# Patient Record
Sex: FEMALE | Race: WHITE | Employment: PART TIME | RURAL
[De-identification: names, ages, dates, MRNs, and addresses within clinical notes are randomized per-mention and may not be internally consistent; named-entity substitution may affect disease eponyms.]

---

## 2017-01-02 PROBLEM — F41.9 ANXIETY: Status: ACTIVE | Noted: 2017-01-02

## 2017-01-02 PROBLEM — M79.602 LEFT ARM PAIN: Status: ACTIVE | Noted: 2017-01-02

## 2017-01-02 PROBLEM — I10 ESSENTIAL HYPERTENSION: Status: ACTIVE | Noted: 2017-01-02

## 2017-01-02 PROBLEM — N63.0 BREAST NODULE: Status: ACTIVE | Noted: 2017-01-02

## 2017-01-02 PROBLEM — M15.9 OSTEOARTHRITIS OF MULTIPLE JOINTS: Status: ACTIVE | Noted: 2017-01-02

## 2017-02-02 ENCOUNTER — NURSE ONLY (OUTPATIENT)
Dept: PRIMARY CARE CLINIC | Age: 52
End: 2017-02-02
Payer: COMMERCIAL

## 2017-02-02 DIAGNOSIS — Z23 NEED FOR PERTUSSIS VACCINATION: Primary | ICD-10-CM

## 2017-02-02 PROCEDURE — 90471 IMMUNIZATION ADMIN: CPT | Performed by: FAMILY MEDICINE

## 2017-02-02 PROCEDURE — 90715 TDAP VACCINE 7 YRS/> IM: CPT | Performed by: FAMILY MEDICINE

## 2017-02-08 RX ORDER — OSELTAMIVIR PHOSPHATE 75 MG/1
75 CAPSULE ORAL DAILY
Qty: 10 CAPSULE | Refills: 0 | Status: SHIPPED | OUTPATIENT
Start: 2017-02-08 | End: 2017-02-18

## 2017-05-15 RX ORDER — HYDROCHLOROTHIAZIDE 25 MG/1
TABLET ORAL
Qty: 30 TABLET | Refills: 1 | Status: SHIPPED | OUTPATIENT
Start: 2017-05-15 | End: 2017-06-02 | Stop reason: SDUPTHER

## 2017-06-02 ENCOUNTER — OFFICE VISIT (OUTPATIENT)
Dept: PRIMARY CARE CLINIC | Age: 52
End: 2017-06-02
Payer: COMMERCIAL

## 2017-06-02 VITALS
RESPIRATION RATE: 20 BRPM | BODY MASS INDEX: 31.15 KG/M2 | DIASTOLIC BLOOD PRESSURE: 72 MMHG | OXYGEN SATURATION: 98 % | SYSTOLIC BLOOD PRESSURE: 122 MMHG | HEART RATE: 55 BPM | WEIGHT: 165 LBS | HEIGHT: 61 IN

## 2017-06-02 DIAGNOSIS — I10 ESSENTIAL HYPERTENSION: Primary | ICD-10-CM

## 2017-06-02 DIAGNOSIS — M15.9 OSTEOARTHRITIS OF MULTIPLE JOINTS, UNSPECIFIED OSTEOARTHRITIS TYPE: ICD-10-CM

## 2017-06-02 DIAGNOSIS — Z78.0 POSTMENOPAUSAL: ICD-10-CM

## 2017-06-02 PROBLEM — F41.9 ANXIETY: Status: RESOLVED | Noted: 2017-01-02 | Resolved: 2017-06-02

## 2017-06-02 PROBLEM — M79.602 LEFT ARM PAIN: Status: RESOLVED | Noted: 2017-01-02 | Resolved: 2017-06-02

## 2017-06-02 PROCEDURE — 99214 OFFICE O/P EST MOD 30 MIN: CPT | Performed by: FAMILY MEDICINE

## 2017-06-02 RX ORDER — HYDROCHLOROTHIAZIDE 25 MG/1
TABLET ORAL
Qty: 30 TABLET | Refills: 5 | Status: SHIPPED | OUTPATIENT
Start: 2017-06-02 | End: 2018-12-17 | Stop reason: ALTCHOICE

## 2017-06-02 ASSESSMENT — ENCOUNTER SYMPTOMS
NAUSEA: 0
EYE REDNESS: 0
RHINORRHEA: 0
EYE ITCHING: 0
VOMITING: 0
COUGH: 0
SORE THROAT: 0
CONSTIPATION: 0
EYE DISCHARGE: 0
ABDOMINAL PAIN: 0
DIARRHEA: 0
SHORTNESS OF BREATH: 0

## 2017-06-02 ASSESSMENT — PATIENT HEALTH QUESTIONNAIRE - PHQ9
2. FEELING DOWN, DEPRESSED OR HOPELESS: 0
SUM OF ALL RESPONSES TO PHQ9 QUESTIONS 1 & 2: 0
1. LITTLE INTEREST OR PLEASURE IN DOING THINGS: 0
SUM OF ALL RESPONSES TO PHQ QUESTIONS 1-9: 0

## 2017-06-11 PROBLEM — Z78.0 POSTMENOPAUSAL: Status: ACTIVE | Noted: 2017-06-11

## 2017-06-12 ENCOUNTER — TELEPHONE (OUTPATIENT)
Dept: PRIMARY CARE CLINIC | Age: 52
End: 2017-06-12

## 2017-06-14 RX ORDER — ESTRADIOL 0.1 MG/G
1 CREAM VAGINAL EVERY OTHER DAY
Qty: 1 TUBE | Refills: 5 | Status: SHIPPED | OUTPATIENT
Start: 2017-06-14 | End: 2018-03-26 | Stop reason: CLARIF

## 2017-12-13 ENCOUNTER — NURSE ONLY (OUTPATIENT)
Dept: PRIMARY CARE CLINIC | Age: 52
End: 2017-12-13
Payer: COMMERCIAL

## 2017-12-13 ENCOUNTER — HOSPITAL ENCOUNTER (OUTPATIENT)
Dept: GENERAL RADIOLOGY | Age: 52
Discharge: HOME | End: 2017-12-13
Attending: FAMILY MEDICINE | Admitting: FAMILY MEDICINE

## 2017-12-13 DIAGNOSIS — Z23 NEED FOR INFLUENZA VACCINATION: Primary | ICD-10-CM

## 2017-12-13 DIAGNOSIS — Z12.39 BREAST CANCER SCREENING: ICD-10-CM

## 2017-12-13 PROCEDURE — 90688 IIV4 VACCINE SPLT 0.5 ML IM: CPT | Performed by: FAMILY MEDICINE

## 2017-12-13 PROCEDURE — 90471 IMMUNIZATION ADMIN: CPT | Performed by: FAMILY MEDICINE

## 2017-12-15 ENCOUNTER — TELEPHONE (OUTPATIENT)
Dept: PRIMARY CARE CLINIC | Age: 52
End: 2017-12-15

## 2018-01-19 ENCOUNTER — TELEPHONE (OUTPATIENT)
Dept: PRIMARY CARE CLINIC | Age: 53
End: 2018-01-19

## 2018-03-09 ENCOUNTER — TELEPHONE (OUTPATIENT)
Dept: PRIMARY CARE CLINIC | Age: 53
End: 2018-03-09

## 2018-03-09 RX ORDER — AMOXICILLIN AND CLAVULANATE POTASSIUM 875; 125 MG/1; MG/1
1 TABLET, FILM COATED ORAL 2 TIMES DAILY
Qty: 20 TABLET | Refills: 0 | Status: SHIPPED | OUTPATIENT
Start: 2018-03-09 | End: 2018-03-19

## 2018-03-26 ENCOUNTER — OFFICE VISIT (OUTPATIENT)
Dept: PRIMARY CARE CLINIC | Age: 53
End: 2018-03-26
Payer: COMMERCIAL

## 2018-03-26 VITALS
BODY MASS INDEX: 33.04 KG/M2 | RESPIRATION RATE: 20 BRPM | OXYGEN SATURATION: 99 % | SYSTOLIC BLOOD PRESSURE: 128 MMHG | HEART RATE: 62 BPM | WEIGHT: 175 LBS | DIASTOLIC BLOOD PRESSURE: 82 MMHG | HEIGHT: 61 IN

## 2018-03-26 DIAGNOSIS — I10 ESSENTIAL HYPERTENSION: Primary | ICD-10-CM

## 2018-03-26 DIAGNOSIS — M65.332 TRIGGER MIDDLE FINGER OF LEFT HAND: ICD-10-CM

## 2018-03-26 DIAGNOSIS — Z12.11 SCREENING FOR COLON CANCER: ICD-10-CM

## 2018-03-26 DIAGNOSIS — M15.9 OSTEOARTHRITIS OF MULTIPLE JOINTS, UNSPECIFIED OSTEOARTHRITIS TYPE: ICD-10-CM

## 2018-03-26 PROCEDURE — 99214 OFFICE O/P EST MOD 30 MIN: CPT | Performed by: FAMILY MEDICINE

## 2018-03-26 RX ORDER — MELATONIN
1000 NIGHTLY
COMMUNITY

## 2018-03-26 RX ORDER — DICLOFENAC SODIUM 75 MG/1
75 TABLET, DELAYED RELEASE ORAL 2 TIMES DAILY
Qty: 60 TABLET | Refills: 3 | Status: SHIPPED | OUTPATIENT
Start: 2018-03-26 | End: 2018-03-28 | Stop reason: SINTOL

## 2018-03-26 ASSESSMENT — ENCOUNTER SYMPTOMS
EYE ITCHING: 1
EYE DISCHARGE: 0
VOMITING: 0
RHINORRHEA: 0
COUGH: 0
SHORTNESS OF BREATH: 0
EYE REDNESS: 0
SORE THROAT: 0
ABDOMINAL PAIN: 0
NAUSEA: 0
CONSTIPATION: 0
DIARRHEA: 0

## 2018-03-27 ENCOUNTER — NURSE ONLY (OUTPATIENT)
Dept: PRIMARY CARE CLINIC | Age: 53
End: 2018-03-27
Payer: COMMERCIAL

## 2018-03-27 DIAGNOSIS — Z12.11 COLON CANCER SCREENING: Primary | ICD-10-CM

## 2018-03-27 LAB
CONTROL: NORMAL
HEMOCCULT STL QL: NORMAL

## 2018-03-27 PROCEDURE — 82274 ASSAY TEST FOR BLOOD FECAL: CPT | Performed by: FAMILY MEDICINE

## 2018-03-28 ENCOUNTER — TELEPHONE (OUTPATIENT)
Dept: PRIMARY CARE CLINIC | Age: 53
End: 2018-03-28

## 2018-03-28 RX ORDER — CELECOXIB 100 MG/1
100 CAPSULE ORAL DAILY
Qty: 60 CAPSULE | Refills: 3 | Status: SHIPPED | OUTPATIENT
Start: 2018-03-28 | End: 2019-07-22

## 2018-04-19 PROBLEM — M65.332 TRIGGER MIDDLE FINGER OF LEFT HAND: Status: ACTIVE | Noted: 2018-04-19

## 2018-07-03 ENCOUNTER — TELEPHONE (OUTPATIENT)
Dept: ORTHOPEDIC SURGERY | Age: 53
End: 2018-07-03

## 2018-07-31 DIAGNOSIS — R68.89 FORGETFULNESS: Primary | ICD-10-CM

## 2018-07-31 RX ORDER — DICLOFENAC SODIUM 75 MG/1
75 TABLET, DELAYED RELEASE ORAL 2 TIMES DAILY
Qty: 60 TABLET | Refills: 5 | Status: SHIPPED | OUTPATIENT
Start: 2018-07-31 | End: 2018-12-17 | Stop reason: SDUPTHER

## 2018-07-31 NOTE — TELEPHONE ENCOUNTER
Patient needs referral to Neurology per [facility]- for brain fog, popping in her head/white noise sounds. Forgetfulness-family history of dementia. Former [facility] pt. Is going to start seeing us.

## 2018-12-17 ENCOUNTER — OFFICE VISIT (OUTPATIENT)
Dept: PRIMARY CARE CLINIC | Age: 53
End: 2018-12-17
Payer: MEDICAID

## 2018-12-17 VITALS
SYSTOLIC BLOOD PRESSURE: 134 MMHG | BODY MASS INDEX: 31.97 KG/M2 | WEIGHT: 169.2 LBS | HEART RATE: 74 BPM | DIASTOLIC BLOOD PRESSURE: 80 MMHG | OXYGEN SATURATION: 97 %

## 2018-12-17 DIAGNOSIS — I10 ESSENTIAL HYPERTENSION: Primary | ICD-10-CM

## 2018-12-17 DIAGNOSIS — Z12.39 SCREENING FOR BREAST CANCER: ICD-10-CM

## 2018-12-17 DIAGNOSIS — L91.8 SKIN TAG: ICD-10-CM

## 2018-12-17 DIAGNOSIS — Z78.0 POSTMENOPAUSAL: ICD-10-CM

## 2018-12-17 DIAGNOSIS — J20.9 ACUTE BRONCHITIS, UNSPECIFIED ORGANISM: ICD-10-CM

## 2018-12-17 PROCEDURE — 11200 RMVL SKIN TAGS UP TO&INC 15: CPT | Performed by: NURSE PRACTITIONER

## 2018-12-17 PROCEDURE — 99213 OFFICE O/P EST LOW 20 MIN: CPT | Performed by: NURSE PRACTITIONER

## 2018-12-17 PROCEDURE — 90471 IMMUNIZATION ADMIN: CPT | Performed by: NURSE PRACTITIONER

## 2018-12-17 PROCEDURE — G8482 FLU IMMUNIZE ORDER/ADMIN: HCPCS | Performed by: NURSE PRACTITIONER

## 2018-12-17 PROCEDURE — G8427 DOCREV CUR MEDS BY ELIG CLIN: HCPCS | Performed by: NURSE PRACTITIONER

## 2018-12-17 PROCEDURE — 3017F COLORECTAL CA SCREEN DOC REV: CPT | Performed by: NURSE PRACTITIONER

## 2018-12-17 PROCEDURE — 90688 IIV4 VACCINE SPLT 0.5 ML IM: CPT | Performed by: NURSE PRACTITIONER

## 2018-12-17 PROCEDURE — G8417 CALC BMI ABV UP PARAM F/U: HCPCS | Performed by: NURSE PRACTITIONER

## 2018-12-17 PROCEDURE — 4004F PT TOBACCO SCREEN RCVD TLK: CPT | Performed by: NURSE PRACTITIONER

## 2018-12-17 RX ORDER — AZITHROMYCIN 250 MG/1
TABLET, FILM COATED ORAL
Qty: 6 TABLET | Refills: 0 | Status: SHIPPED | OUTPATIENT
Start: 2018-12-17 | End: 2018-12-27

## 2018-12-17 RX ORDER — DICLOFENAC SODIUM 75 MG/1
75 TABLET, DELAYED RELEASE ORAL 2 TIMES DAILY
Qty: 60 TABLET | Refills: 5 | Status: SHIPPED | OUTPATIENT
Start: 2018-12-17 | End: 2020-02-24 | Stop reason: SDUPTHER

## 2018-12-17 ASSESSMENT — ENCOUNTER SYMPTOMS
ABDOMINAL PAIN: 0
VOMITING: 0
COUGH: 1
SORE THROAT: 0
NAUSEA: 0
SHORTNESS OF BREATH: 0
WHEEZING: 1
EYE PAIN: 0

## 2018-12-17 ASSESSMENT — PATIENT HEALTH QUESTIONNAIRE - PHQ9
2. FEELING DOWN, DEPRESSED OR HOPELESS: 0
1. LITTLE INTEREST OR PLEASURE IN DOING THINGS: 0
SUM OF ALL RESPONSES TO PHQ QUESTIONS 1-9: 0
SUM OF ALL RESPONSES TO PHQ9 QUESTIONS 1 & 2: 0
SUM OF ALL RESPONSES TO PHQ QUESTIONS 1-9: 0

## 2018-12-17 NOTE — PROGRESS NOTES
Have you seen any other physician or provider since your last visit? no    Have you had any other diagnostic tests since your last visit? no    Have you changed or stopped any medications since your last visit including any over-the-counter medicines, vitamins, or herbal medicines? no     Are you taking all your prescribed medications? Yes  If NO, why? -  N/A      REVIEW OF SYSTEMS:  Review of Systems   Constitutional: Negative for chills and fever. HENT: Negative for ear pain and sore throat. Eyes: Negative for pain and visual disturbance. Respiratory: Positive for cough and wheezing. Negative for shortness of breath. Cardiovascular: Negative for chest pain, palpitations and leg swelling. Gastrointestinal: Negative for abdominal pain, nausea and vomiting. Genitourinary: Negative for dysuria and hematuria. Musculoskeletal: Negative for joint swelling. Skin: Negative for rash. Neurological: Negative for dizziness and weakness. Psychiatric/Behavioral: Negative for sleep disturbance.

## 2019-01-21 ENCOUNTER — HOSPITAL ENCOUNTER (OUTPATIENT)
Dept: MAMMOGRAPHY | Facility: HOSPITAL | Age: 54
Discharge: HOME | End: 2019-01-21
Payer: MEDICAID

## 2019-01-21 ENCOUNTER — HOSPITAL ENCOUNTER (OUTPATIENT)
Facility: HOSPITAL | Age: 54
Discharge: HOME | End: 2019-01-21
Payer: MEDICAID

## 2019-01-21 DIAGNOSIS — I10 ESSENTIAL HYPERTENSION: ICD-10-CM

## 2019-01-21 DIAGNOSIS — Z12.39 SCREENING FOR BREAST CANCER: ICD-10-CM

## 2019-01-21 LAB
A/G RATIO: 1.7 (ref 0.8–2)
ALBUMIN SERPL-MCNC: 4.2 G/DL (ref 3.4–4.8)
ALP BLD-CCNC: 91 U/L (ref 25–100)
ALT SERPL-CCNC: 9 U/L (ref 4–36)
ANION GAP SERPL CALCULATED.3IONS-SCNC: 8 MMOL/L (ref 3–16)
AST SERPL-CCNC: 11 U/L (ref 8–33)
BASOPHILS ABSOLUTE: 0.1 K/UL (ref 0–0.1)
BASOPHILS RELATIVE PERCENT: 0.7 %
BILIRUB SERPL-MCNC: <0.2 MG/DL (ref 0.3–1.2)
BUN BLDV-MCNC: 17 MG/DL (ref 6–20)
CALCIUM SERPL-MCNC: 9.6 MG/DL (ref 8.5–10.5)
CHLORIDE BLD-SCNC: 108 MMOL/L (ref 98–107)
CHOLESTEROL, TOTAL: 203 MG/DL (ref 0–200)
CO2: 27 MMOL/L (ref 20–30)
CREAT SERPL-MCNC: 1 MG/DL (ref 0.4–1.2)
EOSINOPHILS ABSOLUTE: 0.3 K/UL (ref 0–0.4)
EOSINOPHILS RELATIVE PERCENT: 3.1 %
GFR AFRICAN AMERICAN: >59
GFR NON-AFRICAN AMERICAN: 58
GLOBULIN: 2.5 G/DL
GLUCOSE BLD-MCNC: 90 MG/DL (ref 74–106)
HCT VFR BLD CALC: 42.4 % (ref 37–47)
HDLC SERPL-MCNC: 54 MG/DL (ref 40–60)
HEMOGLOBIN: 13.3 G/DL (ref 11.5–16.5)
IMMATURE GRANULOCYTES #: 0 K/UL
IMMATURE GRANULOCYTES %: 0.2 % (ref 0–5)
LDL CHOLESTEROL CALCULATED: 118 MG/DL
LYMPHOCYTES ABSOLUTE: 2.3 K/UL (ref 1.5–4)
LYMPHOCYTES RELATIVE PERCENT: 26.5 %
MCH RBC QN AUTO: 29.3 PG (ref 27–32)
MCHC RBC AUTO-ENTMCNC: 31.4 G/DL (ref 31–35)
MCV RBC AUTO: 93.4 FL (ref 80–100)
MONOCYTES ABSOLUTE: 0.5 K/UL (ref 0.2–0.8)
MONOCYTES RELATIVE PERCENT: 5.6 %
NEUTROPHILS ABSOLUTE: 5.5 K/UL (ref 2–7.5)
NEUTROPHILS RELATIVE PERCENT: 63.9 %
PDW BLD-RTO: 12.8 % (ref 11–16)
PLATELET # BLD: 213 K/UL (ref 150–400)
PMV BLD AUTO: 11.4 FL (ref 6–10)
POTASSIUM SERPL-SCNC: 5.1 MMOL/L (ref 3.4–5.1)
RBC # BLD: 4.54 M/UL (ref 3.8–5.8)
SODIUM BLD-SCNC: 143 MMOL/L (ref 136–145)
TOTAL PROTEIN: 6.7 G/DL (ref 6.4–8.3)
TRIGL SERPL-MCNC: 155 MG/DL (ref 0–249)
VLDLC SERPL CALC-MCNC: 31 MG/DL
WBC # BLD: 8.7 K/UL (ref 4–11)

## 2019-01-21 PROCEDURE — 80061 LIPID PANEL: CPT

## 2019-01-21 PROCEDURE — 80053 COMPREHEN METABOLIC PANEL: CPT

## 2019-01-21 PROCEDURE — 77063 BREAST TOMOSYNTHESIS BI: CPT

## 2019-01-21 PROCEDURE — 85025 COMPLETE CBC W/AUTO DIFF WBC: CPT

## 2019-01-22 ENCOUNTER — TELEPHONE (OUTPATIENT)
Dept: PRIMARY CARE CLINIC | Age: 54
End: 2019-01-22

## 2019-01-28 ENCOUNTER — TELEPHONE (OUTPATIENT)
Dept: PRIMARY CARE CLINIC | Age: 54
End: 2019-01-28

## 2019-01-28 RX ORDER — DICLOFENAC SODIUM 75 MG/1
75 TABLET, DELAYED RELEASE ORAL 2 TIMES DAILY
Qty: 60 TABLET | Refills: 5 | Status: CANCELLED | OUTPATIENT
Start: 2019-01-28

## 2019-02-18 ENCOUNTER — TELEPHONE (OUTPATIENT)
Dept: PRIMARY CARE CLINIC | Age: 54
End: 2019-02-18

## 2019-02-18 DIAGNOSIS — M79.7 FIBROMYALGIA: ICD-10-CM

## 2019-02-18 DIAGNOSIS — M19.90 OSTEOARTHRITIS, UNSPECIFIED OSTEOARTHRITIS TYPE, UNSPECIFIED SITE: Primary | ICD-10-CM

## 2019-02-22 ENCOUNTER — HOSPITAL ENCOUNTER (OUTPATIENT)
Facility: HOSPITAL | Age: 54
Discharge: HOME | End: 2019-02-22
Payer: MEDICAID

## 2019-02-22 LAB
FOLATE: 12.27 NG/ML
VITAMIN B-12: >2000 PG/ML (ref 211–911)
VITAMIN D 25-HYDROXY: 30.6 (ref 32–100)

## 2019-02-22 PROCEDURE — 84207 ASSAY OF VITAMIN B-6: CPT

## 2019-02-22 PROCEDURE — 84425 ASSAY OF VITAMIN B-1: CPT

## 2019-02-22 PROCEDURE — 82607 VITAMIN B-12: CPT

## 2019-02-22 PROCEDURE — 82306 VITAMIN D 25 HYDROXY: CPT

## 2019-02-22 PROCEDURE — 36415 COLL VENOUS BLD VENIPUNCTURE: CPT

## 2019-02-22 PROCEDURE — 82746 ASSAY OF FOLIC ACID SERUM: CPT

## 2019-02-26 LAB — VITAMIN B1, PLASMA: 8 NMOL/L (ref 4–15)

## 2019-02-27 LAB — VITAMIN B6: 17.7 NMOL/L (ref 20–125)

## 2019-05-09 ENCOUNTER — OFFICE VISIT (OUTPATIENT)
Dept: ORTHOPEDIC SURGERY | Facility: CLINIC | Age: 54
End: 2019-05-09

## 2019-05-09 VITALS — HEIGHT: 61 IN | RESPIRATION RATE: 18 BRPM | WEIGHT: 165 LBS | BODY MASS INDEX: 31.15 KG/M2

## 2019-05-09 DIAGNOSIS — M65.30 TRIGGER FINGER, ACQUIRED: ICD-10-CM

## 2019-05-09 DIAGNOSIS — M25.542 ARTHRALGIA OF LEFT HAND: Primary | ICD-10-CM

## 2019-05-09 PROCEDURE — 20550 NJX 1 TENDON SHEATH/LIGAMENT: CPT | Performed by: ORTHOPAEDIC SURGERY

## 2019-05-09 PROCEDURE — 99203 OFFICE O/P NEW LOW 30 MIN: CPT | Performed by: ORTHOPAEDIC SURGERY

## 2019-05-09 RX ORDER — CHOLECALCIFEROL (VITAMIN D3) 1250 MCG
50000 CAPSULE ORAL DAILY
COMMUNITY
Start: 2014-09-18

## 2019-05-09 RX ORDER — LIDOCAINE HYDROCHLORIDE 10 MG/ML
5 INJECTION, SOLUTION INFILTRATION; PERINEURAL
Status: COMPLETED | OUTPATIENT
Start: 2019-05-09 | End: 2019-05-09

## 2019-05-09 RX ADMIN — LIDOCAINE HYDROCHLORIDE 5 MG: 10 INJECTION, SOLUTION INFILTRATION; PERINEURAL at 16:22

## 2019-05-09 NOTE — PROGRESS NOTES
Subjective   Patient ID: Adelia Crane is a 54 y.o. female  Pain of the Left Hand (Patient is her today for left hand pain, she states 4 weeks ago she fell. She also states her middle finger is locking up. )             History of Present Illness  Left hand dominant female with long history of triggering of the left long finger new injury a month ago fell injured her index metacarpal region initially swollen minimal bruising the pain is continued feels like a knot between her index and long metacarpal head area.  Denies paresthesias numbness tingling weakness or ulnar-sided symptoms.      Review of Systems   Constitutional: Negative for fever.   HENT: Negative for voice change.    Eyes: Negative for visual disturbance.   Respiratory: Negative for shortness of breath.    Cardiovascular: Negative for chest pain.   Gastrointestinal: Negative for abdominal distention and abdominal pain.   Genitourinary: Negative for dysuria.   Musculoskeletal: Positive for arthralgias. Negative for gait problem and joint swelling.   Skin: Negative for rash.   Neurological: Negative for speech difficulty.   Hematological: Does not bruise/bleed easily.   Psychiatric/Behavioral: Negative for confusion.       History reviewed. No pertinent past medical history.     Past Surgical History:   Procedure Laterality Date   • CHOLECYSTECTOMY     • D&C AND LAPAROSCOPY         History reviewed. No pertinent family history.    Social History     Socioeconomic History   • Marital status:      Spouse name: Not on file   • Number of children: Not on file   • Years of education: Not on file   • Highest education level: Not on file   Tobacco Use   • Smoking status: Current Every Day Smoker   • Smokeless tobacco: Never Used   Substance and Sexual Activity   • Alcohol use: No     Frequency: Never   • Drug use: Yes     Types: Marijuana   • Sexual activity: Defer       I have reviewed all of the above social hx, family hx, surgical hx,  "medications, allergies & ROS and confirm that it is accurate.    No Known Allergies      Current Outpatient Medications:   •  Cholecalciferol (VITAMIN D3) 42016 units capsule, Take  by mouth., Disp: , Rfl:   •  progesterone (PROMETRIUM) 100 MG capsule, Take 100 mg by mouth., Disp: , Rfl:     Objective   Resp 18   Ht 154.9 cm (61\")   Wt 74.8 kg (165 lb)   BMI 31.18 kg/m²    Physical Exam  Constitutional: Patient is oriented to person, place, and time. Patient appears well-developed and well-nourished.   HENT:Head: Normocephalic and atraumatic.   Eyes: EOM are normal. Pupils are equal, round, and reactive to light.   Neck: Normal range of motion. Neck supple.   Cardiovascular: Normal rate.    Pulmonary/Chest: Effort normal and breath sounds normal.   Abdominal: Soft.   Neurological: Patient is alert and oriented to person, place, and time.   Skin: Skin is warm and dry.   Psychiatric: Patient has a normal mood and affect.   Nursing note and vitals reviewed.       [unfilled]   Left hand: Some tenderness between the metacarpal heads of the index long finger slight swelling noted no ecchymosis, full extensor tendon function, negative intrinsic weakness, triggering noted over the A1 pulley of the long finger left hand, no atrophy in the hand normal sensation and circulation    Assessment/Plan   Review of Radiographic Studies:    Radiographic images today of affected area I personally viewed and showed no sign of acute fracture or dislocation.      Left hand middle finger trigger finger injection  Date/Time: 5/9/2019 4:22 PM  Performed by: Miah Javier MD  Authorized by: Miah Javier MD   Consent: Verbal consent obtained.  Risks and benefits: risks, benefits and alternatives were discussed  Consent given by: patient  Patient understanding: patient states understanding of the procedure being performed  Patient consent: the patient's understanding of the procedure matches consent given  Procedure consent: " "procedure consent matches procedure scheduled  Relevant documents: relevant documents present and verified  Test results: test results available and properly labeled  Site marked: the operative site was marked  Imaging studies: imaging studies available  Patient identity confirmed: verbally with patient  Time out: Immediately prior to procedure a \"time out\" was called to verify the correct patient, procedure, equipment, support staff and site/side marked as required.  Preparation: Patient was prepped and draped in the usual sterile fashion.  Patient tolerance: Patient tolerated the procedure well with no immediate complications  Comments: 0.5cc triamcinolone [id number] exp-01/01/2021  Medications administered: 5 mg lidocaine 1 %           Adelia was seen today for pain.    Diagnoses and all orders for this visit:    Arthralgia of left hand  -     Cancel: XR Hand 3+ View Left  -     XR finger 2+ vw left    Trigger finger, acquired       Orthopedic activities reviewed and patient expressed appreciation and Risk, benefits, and merits of treatment alternatives reviewed with the patient and questions answered      Recommendations/Plan:   Work/Activity Status: May perform usual activities as tolerated    Patient agreeable to call or return sooner for any concerns.       Reviewed with her diagrams demonstrating the findings of trigger finger with a sprain of the MP joint recommendation made for nonsurgical treatment at this time but if recurrence occurs surgical release could be performed.      Impression:  Left dominant hand long finger trigger finger, left index MP strain  Plan:  Mobilization as tolerated return as needed  "

## 2019-05-20 ENCOUNTER — NURSE ONLY (OUTPATIENT)
Dept: PRIMARY CARE CLINIC | Age: 54
End: 2019-05-20
Payer: MEDICAID

## 2019-05-20 PROCEDURE — 86580 TB INTRADERMAL TEST: CPT | Performed by: INTERNAL MEDICINE

## 2019-05-20 NOTE — PROGRESS NOTES
PPD Placement note  Daphney Mclean, 53 y.o. female is here today for placement of PPD test  Reason for PPD test: work    PPD placed on 5/20/2019 in left forearm. Patient advised to return for reading within 48-72 hours.

## 2019-05-22 ENCOUNTER — NURSE ONLY (OUTPATIENT)
Dept: PRIMARY CARE CLINIC | Age: 54
End: 2019-05-22

## 2019-05-22 NOTE — PROGRESS NOTES
PPD Reading Note  PPD read and results entered in [address]. Result: 0 mm induration.   If test not read within 48-72 hours of initial placement, patient advised to repeat in other arm 1-3 weeks after this test.  Allergic reaction: no

## 2019-07-22 ENCOUNTER — OFFICE VISIT (OUTPATIENT)
Dept: PRIMARY CARE CLINIC | Age: 54
End: 2019-07-22
Payer: MEDICAID

## 2019-07-22 VITALS
HEIGHT: 61 IN | WEIGHT: 171 LBS | BODY MASS INDEX: 32.28 KG/M2 | HEART RATE: 76 BPM | TEMPERATURE: 97.7 F | OXYGEN SATURATION: 98 % | DIASTOLIC BLOOD PRESSURE: 91 MMHG | SYSTOLIC BLOOD PRESSURE: 157 MMHG

## 2019-07-22 DIAGNOSIS — R06.2 BILATERAL WHEEZING: ICD-10-CM

## 2019-07-22 DIAGNOSIS — J20.9 ACUTE BRONCHITIS, UNSPECIFIED ORGANISM: Primary | ICD-10-CM

## 2019-07-22 PROCEDURE — G8427 DOCREV CUR MEDS BY ELIG CLIN: HCPCS | Performed by: NURSE PRACTITIONER

## 2019-07-22 PROCEDURE — 99213 OFFICE O/P EST LOW 20 MIN: CPT | Performed by: NURSE PRACTITIONER

## 2019-07-22 PROCEDURE — 94640 AIRWAY INHALATION TREATMENT: CPT | Performed by: NURSE PRACTITIONER

## 2019-07-22 PROCEDURE — 3017F COLORECTAL CA SCREEN DOC REV: CPT | Performed by: NURSE PRACTITIONER

## 2019-07-22 PROCEDURE — 4004F PT TOBACCO SCREEN RCVD TLK: CPT | Performed by: NURSE PRACTITIONER

## 2019-07-22 PROCEDURE — G8417 CALC BMI ABV UP PARAM F/U: HCPCS | Performed by: NURSE PRACTITIONER

## 2019-07-22 RX ORDER — IPRATROPIUM BROMIDE AND ALBUTEROL SULFATE 2.5; .5 MG/3ML; MG/3ML
1 SOLUTION RESPIRATORY (INHALATION) ONCE
Status: COMPLETED | OUTPATIENT
Start: 2019-07-22 | End: 2019-07-22

## 2019-07-22 RX ORDER — BENZONATATE 200 MG/1
200 CAPSULE ORAL 3 TIMES DAILY PRN
Qty: 15 CAPSULE | Refills: 0 | Status: SHIPPED | OUTPATIENT
Start: 2019-07-22 | End: 2019-07-29

## 2019-07-22 RX ORDER — PREDNISONE 20 MG/1
20 TABLET ORAL 2 TIMES DAILY
Qty: 10 TABLET | Refills: 0 | Status: SHIPPED | OUTPATIENT
Start: 2019-07-22 | End: 2019-07-27

## 2019-07-22 RX ORDER — CEFTRIAXONE 1 G/1
1 INJECTION, POWDER, FOR SOLUTION INTRAMUSCULAR; INTRAVENOUS ONCE
Status: COMPLETED | OUTPATIENT
Start: 2019-07-22 | End: 2019-07-22

## 2019-07-22 RX ORDER — METHYLPREDNISOLONE SODIUM SUCCINATE 40 MG/ML
40 INJECTION, POWDER, LYOPHILIZED, FOR SOLUTION INTRAMUSCULAR; INTRAVENOUS ONCE
Status: COMPLETED | OUTPATIENT
Start: 2019-07-22 | End: 2019-07-22

## 2019-07-22 RX ADMIN — METHYLPREDNISOLONE SODIUM SUCCINATE 40 MG: 40 INJECTION, POWDER, LYOPHILIZED, FOR SOLUTION INTRAMUSCULAR; INTRAVENOUS at 15:12

## 2019-07-22 RX ADMIN — IPRATROPIUM BROMIDE AND ALBUTEROL SULFATE 1 AMPULE: 2.5; .5 SOLUTION RESPIRATORY (INHALATION) at 15:11

## 2019-07-22 RX ADMIN — CEFTRIAXONE 1 G: 1 INJECTION, POWDER, FOR SOLUTION INTRAMUSCULAR; INTRAVENOUS at 15:11

## 2019-07-22 ASSESSMENT — PATIENT HEALTH QUESTIONNAIRE - PHQ9
SUM OF ALL RESPONSES TO PHQ QUESTIONS 1-9: 0
SUM OF ALL RESPONSES TO PHQ QUESTIONS 1-9: 0
SUM OF ALL RESPONSES TO PHQ9 QUESTIONS 1 & 2: 0
2. FEELING DOWN, DEPRESSED OR HOPELESS: 0
1. LITTLE INTEREST OR PLEASURE IN DOING THINGS: 0

## 2019-07-30 ASSESSMENT — ENCOUNTER SYMPTOMS
SINUS PRESSURE: 1
BACK PAIN: 0
CHEST TIGHTNESS: 0
SORE THROAT: 0
ABDOMINAL PAIN: 0
WHEEZING: 1
SINUS PAIN: 0
COLOR CHANGE: 0
COUGH: 1
SHORTNESS OF BREATH: 1
NAUSEA: 0
DIARRHEA: 0
TROUBLE SWALLOWING: 0

## 2019-10-02 ENCOUNTER — TELEPHONE (OUTPATIENT)
Dept: PRIMARY CARE CLINIC | Age: 54
End: 2019-10-02

## 2019-10-04 ENCOUNTER — OFFICE VISIT (OUTPATIENT)
Dept: PRIMARY CARE CLINIC | Age: 54
End: 2019-10-04
Payer: MEDICAID

## 2019-10-04 VITALS
DIASTOLIC BLOOD PRESSURE: 80 MMHG | HEIGHT: 61 IN | TEMPERATURE: 97 F | HEART RATE: 65 BPM | OXYGEN SATURATION: 100 % | SYSTOLIC BLOOD PRESSURE: 140 MMHG | BODY MASS INDEX: 32.17 KG/M2 | WEIGHT: 170.4 LBS

## 2019-10-04 DIAGNOSIS — R10.30 LOWER ABDOMINAL PAIN: Primary | ICD-10-CM

## 2019-10-04 DIAGNOSIS — R19.7 DIARRHEA, UNSPECIFIED TYPE: ICD-10-CM

## 2019-10-04 PROCEDURE — G8417 CALC BMI ABV UP PARAM F/U: HCPCS | Performed by: NURSE PRACTITIONER

## 2019-10-04 PROCEDURE — 3017F COLORECTAL CA SCREEN DOC REV: CPT | Performed by: NURSE PRACTITIONER

## 2019-10-04 PROCEDURE — 4004F PT TOBACCO SCREEN RCVD TLK: CPT | Performed by: NURSE PRACTITIONER

## 2019-10-04 PROCEDURE — G8427 DOCREV CUR MEDS BY ELIG CLIN: HCPCS | Performed by: NURSE PRACTITIONER

## 2019-10-04 PROCEDURE — 99213 OFFICE O/P EST LOW 20 MIN: CPT | Performed by: NURSE PRACTITIONER

## 2019-10-04 PROCEDURE — G8484 FLU IMMUNIZE NO ADMIN: HCPCS | Performed by: NURSE PRACTITIONER

## 2019-10-04 RX ORDER — CIPROFLOXACIN 500 MG/1
500 TABLET, FILM COATED ORAL 2 TIMES DAILY
Qty: 20 TABLET | Refills: 0 | Status: SHIPPED | OUTPATIENT
Start: 2019-10-04 | End: 2019-10-14

## 2019-10-04 RX ORDER — MONTELUKAST SODIUM 4 MG/1
1 TABLET, CHEWABLE ORAL 2 TIMES DAILY PRN
Qty: 60 TABLET | Refills: 3 | Status: SHIPPED | OUTPATIENT
Start: 2019-10-04 | End: 2020-02-24 | Stop reason: ALTCHOICE

## 2019-10-04 ASSESSMENT — ENCOUNTER SYMPTOMS
EYE PAIN: 0
VOMITING: 0
SHORTNESS OF BREATH: 0
NAUSEA: 0
ABDOMINAL PAIN: 0
COUGH: 0
SORE THROAT: 0

## 2019-10-28 ENCOUNTER — HOSPITAL ENCOUNTER (OUTPATIENT)
Dept: CT IMAGING | Facility: HOSPITAL | Age: 54
Discharge: HOME | End: 2019-10-28
Payer: MEDICAID

## 2019-10-28 DIAGNOSIS — R10.30 LOWER ABDOMINAL PAIN: ICD-10-CM

## 2019-10-28 DIAGNOSIS — R19.7 DIARRHEA, UNSPECIFIED TYPE: ICD-10-CM

## 2019-10-28 PROCEDURE — 74177 CT ABD & PELVIS W/CONTRAST: CPT

## 2019-10-28 PROCEDURE — 6360000004 HC RX CONTRAST MEDICATION: Performed by: NURSE PRACTITIONER

## 2019-10-28 RX ADMIN — IOPAMIDOL 100 ML: 755 INJECTION, SOLUTION INTRAVENOUS at 15:17

## 2019-10-31 DIAGNOSIS — N83.201 CYST OF RIGHT OVARY: Primary | ICD-10-CM

## 2019-12-04 ENCOUNTER — TELEPHONE (OUTPATIENT)
Dept: SURGERY | Facility: CLINIC | Age: 54
End: 2019-12-04

## 2019-12-11 RX ORDER — BISACODYL 5 MG/1
TABLET, DELAYED RELEASE ORAL
Qty: 4 TABLET | Refills: 0 | Status: SHIPPED | OUTPATIENT
Start: 2019-12-11 | End: 2020-07-01

## 2019-12-11 RX ORDER — POLYETHYLENE GLYCOL 3350 17 G/17G
238 POWDER, FOR SOLUTION ORAL ONCE
Qty: 1 EACH | Refills: 1 | Status: SHIPPED | OUTPATIENT
Start: 2019-12-11 | End: 2019-12-11

## 2019-12-11 NOTE — TELEPHONE ENCOUNTER
Pt scheduled for open access screening colonoscopy @  on 01/13/2020.PRESCREENING FOR OPEN ACCESS SCHEDULING    Adelia Tamayo, 1965  [id number]    12/11/19    If, the patient answers yes to any of the following questions the provider will be informed prior to scheduling open access for approval and documented in the chart.    []  Yes  [x] No    1. Have you ever had a colonoscopy in the past?      When:        Where:       Polyps or other:     []  Yes  [x] No    2. Family history of colon cancer?      Relation:       Age of onset:       Do you currently have any of the following?    []  Yes  [x] No  Rectal bleeding, if so, how long?     []  Yes  [x] No  Abdominal pain, if so, how long?    []  Yes  [x] No  Constipation, if so, how long?    []  Yes  [x] No  Diarrhea, if so, how long?    []  Yes  [x] No  Weight loss, is so, how much?    [] Yes  [x] No  Small caliber stool, if so, how long?      Have you ever had any of the following conditions?    [] Yes  [x] No  Heart attack?      When?       Last cardiac workup?     Blood thinners?    [] Yes  [x] No   Lung problems, asthma or COPD?  [] Yes  [x] No  Oxygen required?       [] Yes  [x] No  Stroke?     [] Yes  [x] No  Have you ever had a reaction to anesthesia?

## 2019-12-19 ENCOUNTER — PREP FOR SURGERY (OUTPATIENT)
Dept: OTHER | Facility: HOSPITAL | Age: 54
End: 2019-12-19

## 2019-12-19 DIAGNOSIS — Z12.11 SCREENING FOR COLON CANCER: Primary | ICD-10-CM

## 2019-12-20 PROBLEM — Z12.11 SCREENING FOR COLON CANCER: Status: ACTIVE | Noted: 2019-12-20

## 2020-01-13 ENCOUNTER — ANESTHESIA EVENT (OUTPATIENT)
Dept: GASTROENTEROLOGY | Facility: HOSPITAL | Age: 55
End: 2020-01-13

## 2020-01-13 ENCOUNTER — HOSPITAL ENCOUNTER (OUTPATIENT)
Facility: HOSPITAL | Age: 55
Setting detail: HOSPITAL OUTPATIENT SURGERY
Discharge: HOME | End: 2020-01-13
Attending: SURGERY | Admitting: SURGERY

## 2020-01-13 ENCOUNTER — ANESTHESIA (OUTPATIENT)
Dept: GASTROENTEROLOGY | Facility: HOSPITAL | Age: 55
End: 2020-01-13

## 2020-01-13 VITALS
RESPIRATION RATE: 20 BRPM | BODY MASS INDEX: 30.96 KG/M2 | OXYGEN SATURATION: 100 % | WEIGHT: 164 LBS | TEMPERATURE: 98.5 F | HEART RATE: 55 BPM | DIASTOLIC BLOOD PRESSURE: 75 MMHG | SYSTOLIC BLOOD PRESSURE: 184 MMHG | HEIGHT: 61 IN

## 2020-01-13 DIAGNOSIS — Z12.11 SCREENING FOR COLON CANCER: ICD-10-CM

## 2020-01-13 PROCEDURE — 25010000002 PROPOFOL 200 MG/20ML EMULSION: Performed by: NURSE ANESTHETIST, CERTIFIED REGISTERED

## 2020-01-13 PROCEDURE — S0260 H&P FOR SURGERY: HCPCS | Performed by: SURGERY

## 2020-01-13 RX ORDER — PROPOFOL 10 MG/ML
INJECTION, EMULSION INTRAVENOUS AS NEEDED
Status: DISCONTINUED | OUTPATIENT
Start: 2020-01-13 | End: 2020-01-13 | Stop reason: SURG

## 2020-01-13 RX ORDER — SODIUM CHLORIDE 0.9 % (FLUSH) 0.9 %
10 SYRINGE (ML) INJECTION AS NEEDED
Status: DISCONTINUED | OUTPATIENT
Start: 2020-01-13 | End: 2020-01-13 | Stop reason: HOSPADM

## 2020-01-13 RX ORDER — ESTRADIOL 0.05 MG/D
1 PATCH TRANSDERMAL WEEKLY
COMMUNITY

## 2020-01-13 RX ORDER — SODIUM CHLORIDE 9 MG/ML
50 INJECTION, SOLUTION INTRAVENOUS CONTINUOUS
Status: DISCONTINUED | OUTPATIENT
Start: 2020-01-13 | End: 2020-01-13 | Stop reason: HOSPADM

## 2020-01-13 RX ORDER — SODIUM CHLORIDE, SODIUM LACTATE, POTASSIUM CHLORIDE, CALCIUM CHLORIDE 600; 310; 30; 20 MG/100ML; MG/100ML; MG/100ML; MG/100ML
1000 INJECTION, SOLUTION INTRAVENOUS CONTINUOUS
Status: CANCELLED | OUTPATIENT
Start: 2020-01-13

## 2020-01-13 RX ADMIN — SODIUM CHLORIDE 50 ML/HR: 9 INJECTION, SOLUTION INTRAVENOUS at 12:09

## 2020-01-13 RX ADMIN — PROPOFOL 50 MG: 10 INJECTION, EMULSION INTRAVENOUS at 13:24

## 2020-01-13 RX ADMIN — PROPOFOL 100 MG: 10 INJECTION, EMULSION INTRAVENOUS at 13:19

## 2020-01-13 RX ADMIN — PROPOFOL 50 MG: 10 INJECTION, EMULSION INTRAVENOUS at 13:16

## 2020-01-13 RX ADMIN — PROPOFOL 100 MG: 10 INJECTION, EMULSION INTRAVENOUS at 13:11

## 2020-01-13 NOTE — ANESTHESIA POSTPROCEDURE EVALUATION
Patient: Adelia Tamayo    Procedure Summary     Date:  01/13/20 Room / Location:  [facility] ENDOSCOPY 2 / [facility] ENDOSCOPY    Anesthesia Start:  1309 Anesthesia Stop:  1329    Procedure:  COLONOSCOPY WITH HOT FORCEP POLYPECTOMY (N/A ) Diagnosis:       Screening for colon cancer      (Screening for colon cancer [Z12.11])    Surgeon:  Moises Bonilla MD Provider:  Andrzej Rosenberg CRNA    Anesthesia Type:  MAC ASA Status:  2          Anesthesia Type: MAC    Vitals  No vitals data found for the desired time range.          Post Anesthesia Care and Evaluation    Patient location during evaluation: bedside  Patient participation: complete - patient participated  Level of consciousness: awake and alert  Pain score: 0  Pain management: adequate  Airway patency: patent  Anesthetic complications: No anesthetic complications  PONV Status: none  Cardiovascular status: acceptable  Respiratory status: acceptable  Hydration status: acceptable

## 2020-01-13 NOTE — ANESTHESIA PREPROCEDURE EVALUATION
Anesthesia Evaluation     Patient summary reviewed and Nursing notes reviewed   NPO Solid Status: > 8 hours  NPO Liquid Status: > 8 hours           Airway   Mallampati: II  TM distance: >3 FB  Neck ROM: full  No difficulty expected  Dental      Pulmonary    Cardiovascular         Neuro/Psych  GI/Hepatic/Renal/Endo      Musculoskeletal     Abdominal    Substance History      OB/GYN          Other                        Anesthesia Plan    ASA 2     MAC       Anesthetic plan, all risks, benefits, and alternatives have been provided, discussed and informed consent has been obtained with: patient.

## 2020-01-13 NOTE — DISCHARGE INSTRUCTIONS
No pushing, pulling, tugging,  heavy lifting, or strenuous activity.  No major decision making, driving, or drinking alcoholic beverages for 24 hours. ( due to the medications you have  received)  Always use good hand hygiene/washing techniques.  NO driving while taking pain medications.      To assist you in voiding:  Drink plenty of fluids  Listen to running water while attempting to void.    If you are unable to urinate and you have an uncomfortable urge to void or it has been   6 hours since you were discharged, return to the Emergency Room

## 2020-01-13 NOTE — H&P
Reason for Consultation:  Screening colonoscopy    Chief complaint :  Screening colonoscopy    SUBJECTIVE:    History of present illness:  I did see the patient today as a consultation for evaluation and treatment of a need for screening colonoscopy.  There are no other complaints of.    Review of Systems:    Review of Systems - General ROS: negative for - chills, fatigue, fever, hot flashes, malaise or night sweats  Psychological ROS: negative for - behavioral disorder, disorientation, hallucinations, hostility or mood swings  ENT ROS: negative for - nasal polyps, oral lesions, sinus pain, sneezing or sore throat  Breast ROS: negative for - galactorrhea or new or changing breast lumps  Respiratory ROS: negative for - hemoptysis, orthopnea, pleuritic pain, sputum changes or stridor  Cardiovascular ROS: negative for - dyspnea on exertion, edema, irregular heartbeat, murmur, orthopnea, palpitations or rapid heart rate  Gastrointestinal ROS: negative for - change in stools, gas/bloating, hematemesis, melena or stool incontinence.  Genito-Urinary ROS: negative for - dysuria, genital ulcers, nocturia or pelvic pain  Musculoskeletal ROS: negative for - gait disturbance or muscle pain  Neurological ROS: negative for - dizziness, gait disturbance, memory loss, numbness/tingling or seizures      Allergies:  Allergies   Allergen Reactions   • Toxin Control [Calcium Carbonate-Charcoal] Unknown - High Severity     Rattle snake bite at 19 mo       Medications:    Current Facility-Administered Medications:   •  sodium chloride 0.9 % infusion, 50 mL/hr, Intravenous, Continuous, Joey Garay CRNA, Last Rate: 50 mL/hr at 20 1209, 50 mL/hr at 20 1209    History:  Past Medical History:   Diagnosis Date   • Body piercing     Both ears, nose   • Cataract    • Fibromyalgia    • History of being tatooed     x's 1       Past Surgical History:   Procedure Laterality Date   • CARPAL TUNNEL RELEASE Left    •   SECTION      x's 3   • CHOLECYSTECTOMY     • D&C AND LAPAROSCOPY     • KNEE SURGERY Right        History reviewed. No pertinent family history.    Social History     Tobacco Use   • Smoking status: Current Every Day Smoker     Packs/day: 0.25   • Smokeless tobacco: Never Used   Substance Use Topics   • Alcohol use: No     Frequency: Never   • Drug use: Yes     Types: Marijuana          OBJECTIVE:    Vital Signs   Temp:  [97.7 °F (36.5 °C)] 97.7 °F (36.5 °C)  Heart Rate:  [68] 68  Resp:  [16] 16  BP: (146-189)/() 146/79    Physical Exam:     General Appearance:    Alert, cooperative, in no acute distress   Head:    Normocephalic, without obvious abnormality, atraumatic   Eyes:            Lids and lashes normal, conjunctivae and sclerae normal, no   icterus, no pallor, corneas clear, PERRLA   Ears:    Ears appear intact with no abnormalities noted   Throat:   No oral lesions, no thrush, oral mucosa moist   Neck:   No adenopathy, supple, trachea midline, no thyromegaly, no   carotid bruit, no JVD   Back:     No kyphosis present, no scoliosis present, no skin lesions,      erythema or scars, no tenderness to percussion or                   palpation,   range of motion normal   Lungs:     Clear to auscultation,respirations regular, even and                  unlabored    Heart:    Regular rhythm and normal rate, normal S1 and S2, no            murmur, no gallop, no rub, no click   Chest Wall:    No abnormalities observed   Abdomen:     Normal bowel sounds, no masses, no organomegaly, soft        non-tender, non-distended, no guarding, there is no evidence of tenderness   Extremities:   Moves all extremities well, no edema, no cyanosis, no             redness   Pulses:   Pulses palpable and equal bilaterally   Skin:   No bleeding, bruising or rash   Lymph nodes:   No palpable adenopathy   Neurologic:   Cranial nerves 2 - 12 grossly intact, sensation intact, DTR       present and equal bilaterally            ASSESSMENT/PLAN:    Screening colonoscopy      I did have a detailed and extensive discussion with the patient in the office today.  The full risks and benefits of operative versus nonoperative intervention were discussed with the paient, they understand, agree, and wish to proceed with the surgical treatment plan of colonoscopy.      Moises Bonilla MD

## 2020-01-17 LAB
LAB AP CASE REPORT: NORMAL
PATH REPORT.FINAL DX SPEC: NORMAL

## 2020-02-24 ENCOUNTER — OFFICE VISIT (OUTPATIENT)
Dept: PRIMARY CARE CLINIC | Age: 55
End: 2020-02-24
Payer: MEDICAID

## 2020-02-24 ENCOUNTER — HOSPITAL ENCOUNTER (OUTPATIENT)
Facility: HOSPITAL | Age: 55
Discharge: HOME | End: 2020-02-24
Payer: MEDICAID

## 2020-02-24 VITALS
HEART RATE: 60 BPM | SYSTOLIC BLOOD PRESSURE: 130 MMHG | WEIGHT: 167 LBS | OXYGEN SATURATION: 100 % | BODY MASS INDEX: 31.55 KG/M2 | DIASTOLIC BLOOD PRESSURE: 84 MMHG

## 2020-02-24 LAB
A/G RATIO: 1.8 (ref 0.8–2)
ALBUMIN SERPL-MCNC: 4.4 G/DL (ref 3.4–4.8)
ALP BLD-CCNC: 81 U/L (ref 25–100)
ALT SERPL-CCNC: 13 U/L (ref 4–36)
ANION GAP SERPL CALCULATED.3IONS-SCNC: 10 MMOL/L (ref 3–16)
AST SERPL-CCNC: 17 U/L (ref 8–33)
BASOPHILS ABSOLUTE: 0.1 K/UL (ref 0–0.1)
BASOPHILS RELATIVE PERCENT: 1.1 %
BILIRUB SERPL-MCNC: 0.4 MG/DL (ref 0.3–1.2)
BUN BLDV-MCNC: 14 MG/DL (ref 6–20)
CALCIUM SERPL-MCNC: 9.3 MG/DL (ref 8.5–10.5)
CHLORIDE BLD-SCNC: 107 MMOL/L (ref 98–107)
CHOLESTEROL, TOTAL: 211 MG/DL (ref 0–200)
CO2: 26 MMOL/L (ref 20–30)
CREAT SERPL-MCNC: 0.9 MG/DL (ref 0.4–1.2)
EOSINOPHILS ABSOLUTE: 0.3 K/UL (ref 0–0.4)
EOSINOPHILS RELATIVE PERCENT: 3.1 %
GFR AFRICAN AMERICAN: >59
GFR NON-AFRICAN AMERICAN: >60
GLOBULIN: 2.4 G/DL
GLUCOSE BLD-MCNC: 83 MG/DL (ref 74–106)
HCT VFR BLD CALC: 43.1 % (ref 37–47)
HDLC SERPL-MCNC: 57 MG/DL (ref 40–60)
HEMOGLOBIN: 14.3 G/DL (ref 11.5–16.5)
IMMATURE GRANULOCYTES #: 0 K/UL
IMMATURE GRANULOCYTES %: 0.2 % (ref 0–5)
LDL CHOLESTEROL CALCULATED: 128 MG/DL
LYMPHOCYTES ABSOLUTE: 2.3 K/UL (ref 1.5–4)
LYMPHOCYTES RELATIVE PERCENT: 26.5 %
MCH RBC QN AUTO: 30.4 PG (ref 27–32)
MCHC RBC AUTO-ENTMCNC: 33.2 G/DL (ref 31–35)
MCV RBC AUTO: 91.5 FL (ref 80–100)
MONOCYTES ABSOLUTE: 0.5 K/UL (ref 0.2–0.8)
MONOCYTES RELATIVE PERCENT: 5.4 %
NEUTROPHILS ABSOLUTE: 5.6 K/UL (ref 2–7.5)
NEUTROPHILS RELATIVE PERCENT: 63.7 %
PDW BLD-RTO: 12.8 % (ref 11–16)
PLATELET # BLD: 215 K/UL (ref 150–400)
PMV BLD AUTO: 11.5 FL (ref 6–10)
POTASSIUM SERPL-SCNC: 4.5 MMOL/L (ref 3.4–5.1)
RBC # BLD: 4.71 M/UL (ref 3.8–5.8)
SODIUM BLD-SCNC: 143 MMOL/L (ref 136–145)
TOTAL PROTEIN: 6.8 G/DL (ref 6.4–8.3)
TRIGL SERPL-MCNC: 131 MG/DL (ref 0–249)
VITAMIN D 25-HYDROXY: 30.7 (ref 32–100)
VLDLC SERPL CALC-MCNC: 26 MG/DL
WBC # BLD: 8.8 K/UL (ref 4–11)

## 2020-02-24 PROCEDURE — G8427 DOCREV CUR MEDS BY ELIG CLIN: HCPCS | Performed by: NURSE PRACTITIONER

## 2020-02-24 PROCEDURE — G8417 CALC BMI ABV UP PARAM F/U: HCPCS | Performed by: NURSE PRACTITIONER

## 2020-02-24 PROCEDURE — G8484 FLU IMMUNIZE NO ADMIN: HCPCS | Performed by: NURSE PRACTITIONER

## 2020-02-24 PROCEDURE — 4004F PT TOBACCO SCREEN RCVD TLK: CPT | Performed by: NURSE PRACTITIONER

## 2020-02-24 PROCEDURE — 80053 COMPREHEN METABOLIC PANEL: CPT

## 2020-02-24 PROCEDURE — 82306 VITAMIN D 25 HYDROXY: CPT

## 2020-02-24 PROCEDURE — 3017F COLORECTAL CA SCREEN DOC REV: CPT | Performed by: NURSE PRACTITIONER

## 2020-02-24 PROCEDURE — 99214 OFFICE O/P EST MOD 30 MIN: CPT | Performed by: NURSE PRACTITIONER

## 2020-02-24 PROCEDURE — 80061 LIPID PANEL: CPT

## 2020-02-24 PROCEDURE — 85025 COMPLETE CBC W/AUTO DIFF WBC: CPT

## 2020-02-24 RX ORDER — MELATONIN 5 MG
TABLET,CHEWABLE ORAL
COMMUNITY
End: 2020-06-19 | Stop reason: SDUPTHER

## 2020-02-24 RX ORDER — DICLOFENAC SODIUM 75 MG/1
75 TABLET, DELAYED RELEASE ORAL 2 TIMES DAILY
Qty: 60 TABLET | Refills: 5 | Status: SHIPPED | OUTPATIENT
Start: 2020-02-24 | End: 2020-03-10 | Stop reason: ALTCHOICE

## 2020-02-24 RX ORDER — HYDROCHLOROTHIAZIDE 12.5 MG/1
TABLET ORAL
Qty: 30 TABLET | Refills: 5 | Status: SHIPPED | OUTPATIENT
Start: 2020-02-24 | End: 2020-03-10 | Stop reason: ALTCHOICE

## 2020-02-24 RX ORDER — ESTRADIOL 0.05 MG/D
1 PATCH TRANSDERMAL
COMMUNITY
End: 2020-06-19 | Stop reason: SDUPTHER

## 2020-02-24 ASSESSMENT — ENCOUNTER SYMPTOMS
VOMITING: 0
SHORTNESS OF BREATH: 0
EYE PAIN: 0
SORE THROAT: 0
ABDOMINAL PAIN: 0
NAUSEA: 0
COUGH: 0

## 2020-02-24 ASSESSMENT — PATIENT HEALTH QUESTIONNAIRE - PHQ9
SUM OF ALL RESPONSES TO PHQ QUESTIONS 1-9: 0
2. FEELING DOWN, DEPRESSED OR HOPELESS: 0
1. LITTLE INTEREST OR PLEASURE IN DOING THINGS: 0
SUM OF ALL RESPONSES TO PHQ9 QUESTIONS 1 & 2: 0
SUM OF ALL RESPONSES TO PHQ QUESTIONS 1-9: 0

## 2020-03-10 ENCOUNTER — OFFICE VISIT (OUTPATIENT)
Dept: PRIMARY CARE CLINIC | Age: 55
End: 2020-03-10
Payer: MEDICAID

## 2020-03-10 VITALS
HEIGHT: 61 IN | BODY MASS INDEX: 30.81 KG/M2 | TEMPERATURE: 97.2 F | HEART RATE: 63 BPM | SYSTOLIC BLOOD PRESSURE: 153 MMHG | RESPIRATION RATE: 18 BRPM | OXYGEN SATURATION: 98 % | DIASTOLIC BLOOD PRESSURE: 92 MMHG | WEIGHT: 163.2 LBS

## 2020-03-10 PROCEDURE — G8427 DOCREV CUR MEDS BY ELIG CLIN: HCPCS | Performed by: NURSE PRACTITIONER

## 2020-03-10 PROCEDURE — 99214 OFFICE O/P EST MOD 30 MIN: CPT | Performed by: NURSE PRACTITIONER

## 2020-03-10 PROCEDURE — 4004F PT TOBACCO SCREEN RCVD TLK: CPT | Performed by: NURSE PRACTITIONER

## 2020-03-10 PROCEDURE — G8417 CALC BMI ABV UP PARAM F/U: HCPCS | Performed by: NURSE PRACTITIONER

## 2020-03-10 PROCEDURE — G8484 FLU IMMUNIZE NO ADMIN: HCPCS | Performed by: NURSE PRACTITIONER

## 2020-03-10 PROCEDURE — 3017F COLORECTAL CA SCREEN DOC REV: CPT | Performed by: NURSE PRACTITIONER

## 2020-03-10 RX ORDER — HYDROCHLOROTHIAZIDE 12.5 MG/1
12.5 CAPSULE, GELATIN COATED ORAL DAILY
COMMUNITY
End: 2020-06-19 | Stop reason: SDUPTHER

## 2020-03-10 RX ORDER — BENZONATATE 200 MG/1
200 CAPSULE ORAL 3 TIMES DAILY PRN
Qty: 30 CAPSULE | Refills: 0 | Status: SHIPPED | OUTPATIENT
Start: 2020-03-10 | End: 2020-03-17

## 2020-03-10 RX ORDER — GUAIFENESIN 100 MG/5ML
400 SYRUP ORAL 3 TIMES DAILY
Qty: 420 ML | Refills: 0 | Status: SHIPPED | OUTPATIENT
Start: 2020-03-10 | End: 2020-03-17

## 2020-03-10 RX ORDER — MONTELUKAST SODIUM 4 MG/1
1 TABLET, CHEWABLE ORAL DAILY
COMMUNITY
Start: 2019-10-04 | End: 2020-03-10 | Stop reason: ALTCHOICE

## 2020-03-10 RX ORDER — PREDNISONE 10 MG/1
10 TABLET ORAL 2 TIMES DAILY
Qty: 10 TABLET | Refills: 0 | Status: SHIPPED | OUTPATIENT
Start: 2020-03-10 | End: 2020-03-15

## 2020-03-10 RX ORDER — DEXAMETHASONE SODIUM PHOSPHATE 10 MG/ML
10 INJECTION INTRAMUSCULAR; INTRAVENOUS ONCE
Status: COMPLETED | OUTPATIENT
Start: 2020-03-10 | End: 2020-03-10

## 2020-03-10 RX ORDER — LISINOPRIL 20 MG/1
20 TABLET ORAL DAILY
Qty: 30 TABLET | Refills: 1 | Status: SHIPPED | OUTPATIENT
Start: 2020-03-10 | End: 2020-06-19 | Stop reason: SDUPTHER

## 2020-03-10 RX ORDER — AMOXICILLIN AND CLAVULANATE POTASSIUM 875; 125 MG/1; MG/1
1 TABLET, FILM COATED ORAL 2 TIMES DAILY
Qty: 14 TABLET | Refills: 0 | Status: SHIPPED | OUTPATIENT
Start: 2020-03-10 | End: 2020-03-17

## 2020-03-10 RX ADMIN — DEXAMETHASONE SODIUM PHOSPHATE 10 MG: 10 INJECTION INTRAMUSCULAR; INTRAVENOUS at 14:25

## 2020-03-10 ASSESSMENT — ENCOUNTER SYMPTOMS
COUGH: 1
WHEEZING: 1
SORE THROAT: 1
SHORTNESS OF BREATH: 0
SINUS PRESSURE: 0
TROUBLE SWALLOWING: 0
BACK PAIN: 0
ABDOMINAL PAIN: 0
NAUSEA: 0
DIARRHEA: 0
SINUS PAIN: 0

## 2020-03-10 NOTE — PROGRESS NOTES
Chief Complaint   Patient presents with    Chest Congestion     x 1 week    Fatigue         Have you seen any other physician or provider since your last visit no    Have you had any other diagnostic tests since your last visit? no    Have you changed or stopped any medications since your last visit? no
and nausea. Musculoskeletal: Negative for arthralgias, back pain and myalgias. Neurological: Positive for headaches. Negative for dizziness and light-headedness. Past Medical History:   Diagnosis Date    Connective tissue disease ([address]     Fibromyalgia     Hyperlipidemia     Hypertension     Osteoarthritis     Osteomalacia     Poisonous snake bite 25 mos old    swamp rattler    RLS (restless legs syndrome)      Past Surgical History:   Procedure Laterality Date    CARPAL TUNNEL RELEASE Left      SECTION  [id number]    CHOLECYSTECTOMY      DILATION AND CURETTAGE OF UTERUS      KNEE SURGERY Right     OTHER SURGICAL HISTORY      DNC x2     Family History   Problem Relation Age of Onset    High Blood Pressure Mother     Arthritis Mother         RA    COPD Mother     Emphysema Mother     Other Father         parkinson's disease    Dementia Father     High Blood Pressure Brother       Social History     Tobacco Use   Smoking Status Light Tobacco Smoker    Packs/day: 0.25    Years: 15.00    Pack years: 3.75   Smokeless Tobacco Never Used       OBJECTIVE:   Wt Readings from Last 3 Encounters:   03/10/20 163 lb 3.2 oz (74 kg)   20 167 lb (75.8 kg)   10/04/19 170 lb 6.4 oz (77.3 kg)     BP Readings from Last 3 Encounters:   03/10/20 (!) 153/92   20 130/84   10/04/19 (!) 140/80       BP (!) 153/92 (Site: Right Upper Arm, Position: Sitting, Cuff Size: Medium Adult)   Pulse 63   Temp 97.2 °F (36.2 °C) (Oral)   Resp 18   Ht 5' 1\" (1.549 m)   Wt 163 lb 3.2 oz (74 kg)   SpO2 98% Comment: room air  BMI 30.84 kg/m²      Physical Exam  Vitals signs and nursing note reviewed. Constitutional:       Appearance: She is well-developed. HENT:      Head: Normocephalic. Right Ear: External ear normal.      Left Ear: External ear normal.      Nose: Nose normal.      Mouth/Throat:      Pharynx: Posterior oropharyngeal erythema present. No oropharyngeal exudate.

## 2020-03-10 NOTE — PROGRESS NOTES
Chief Complaint   Patient presents with    Hypertension     Aydin here today for a follow up with HTN. She has brought in a record of her BP . Have you seen any other physician or provider since your last visit? no    Have you had any other diagnostic tests since your last visit? no    Have you changed or stopped any medications since your last visit including any over-the-counter medicines, vitamins, or herbal medicines? no     Are you taking all your prescribed medications? Yes  If NO, why? -  N/A    I have recommended that this patient have a immunization for pneumonia, colonoscopy but she declines at this time. I have discussed the risks and benefits of this examination with her. The patient verbalizes understanding. REVIEW OF SYSTEMS:  Review of Systems   Constitutional: Negative for chills and fever. HENT: Negative for ear pain and sore throat. Eyes: Negative for pain and visual disturbance. Respiratory: Negative for cough and shortness of breath. Cardiovascular: Negative for chest pain, palpitations and leg swelling. Gastrointestinal: Negative for abdominal pain, nausea and vomiting. Genitourinary: Negative for dysuria and hematuria. Musculoskeletal: Negative for joint swelling. Skin: Negative for rash. Neurological: Negative for dizziness and weakness. Psychiatric/Behavioral: Negative for sleep disturbance.
She is alert and oriented to person, place, and time. Results in Past 30 Days  Result Component Current Result Ref Range Previous Result Ref Range   Alb 4.4 (2/24/2020) 3.4 - 4.8 g/dL Not in Time Range    Albumin/Globulin Ratio 1.8 (2/24/2020) 0.8 - 2.0 Not in Time Range    Alkaline Phosphatase 81 (2/24/2020) 25 - 100 U/L Not in Time Range    ALT 13 (2/24/2020) 4 - 36 U/L Not in Time Range    AST 17 (2/24/2020) 8 - 33 U/L Not in Time Range    BUN 14 (2/24/2020) 6 - 20 mg/dL Not in Time Range    Calcium 9.3 (2/24/2020) 8.5 - 10.5 mg/dL Not in Time Range    Chloride 107 (2/24/2020) 98 - 107 mmol/L Not in Time Range    CO2 26 (2/24/2020) 20 - 30 mmol/L Not in Time Range    CREATININE 0.9 (2/24/2020) 0.4 - 1.2 mg/dL Not in Time Range    GFR  >59 (2/24/2020) >59 Not in Time Range    GFR Non- >60 (2/24/2020) >59 Not in Time Range    Globulin 2.4 (2/24/2020) g/dL Not in Time Range    Glucose 83 (2/24/2020) 74 - 106 mg/dL Not in Time Range    Potassium 4.5 (2/24/2020) 3.4 - 5.1 mmol/L Not in Time Range    Sodium 143 (2/24/2020) 136 - 145 mmol/L Not in Time Range    Total Bilirubin 0.4 (2/24/2020) 0.3 - 1.2 mg/dL Not in Time Range    Total Protein 6.8 (2/24/2020) 6.4 - 8.3 g/dL Not in Time Range      LDL Calculated (mg/dL)   Date Value   02/24/2020 128       Lab Results   Component Value Date    WBC 8.8 02/24/2020    NEUTROABS 5.6 02/24/2020    HGB 14.3 02/24/2020    HCT 43.1 02/24/2020    MCV 91.5 02/24/2020     02/24/2020     No results found for: TSH    Prior to Visit Medications    Medication Sig Taking?  Authorizing Provider   Melatonin 5 MG CHEW Take by mouth Yes Historical Provider, MD   estradiol (CLIMARA) 0.05 MG/24HR Place 1 patch onto the skin Yes Historical Provider, MD   hydroCHLOROthiazide (HYDRODIURIL) 12.5 MG tablet TAKE 1 TABLET BY MOUTH DAILY Yes EMIL Garg   diclofenac (VOLTAREN) 75 MG EC tablet Take 1 tablet by mouth 2 times daily Yes Janett Haney

## 2020-03-20 ENCOUNTER — TELEPHONE (OUTPATIENT)
Dept: PRIMARY CARE CLINIC | Age: 55
End: 2020-03-20

## 2020-03-20 NOTE — TELEPHONE ENCOUNTER
I wouldn't think the HCTZ would cause cramping. We referred her to Dr. Ramana Juarez () in the fall. Did she see her?

## 2020-03-24 ENCOUNTER — HOSPITAL ENCOUNTER (OUTPATIENT)
Facility: HOSPITAL | Age: 55
Discharge: HOME | End: 2020-03-24
Payer: MEDICAID

## 2020-03-24 LAB
BASOPHILS ABSOLUTE: 0.1 K/UL (ref 0–0.1)
BASOPHILS RELATIVE PERCENT: 1.1 %
C-REACTIVE PROTEIN: 2.7 MG/L (ref 0–5.1)
EOSINOPHILS ABSOLUTE: 0.3 K/UL (ref 0–0.4)
EOSINOPHILS RELATIVE PERCENT: 3.6 %
HCT VFR BLD CALC: 41.8 % (ref 37–47)
HEMOGLOBIN: 13.9 G/DL (ref 11.5–16.5)
IGA: 214 MG/DL (ref 70–400)
IMMATURE GRANULOCYTES #: 0 K/UL
IMMATURE GRANULOCYTES %: 0.2 % (ref 0–5)
LYMPHOCYTES ABSOLUTE: 3 K/UL (ref 1.5–4)
LYMPHOCYTES RELATIVE PERCENT: 31.8 %
MCH RBC QN AUTO: 29.6 PG (ref 27–32)
MCHC RBC AUTO-ENTMCNC: 33.3 G/DL (ref 31–35)
MCV RBC AUTO: 89.1 FL (ref 80–100)
MONOCYTES ABSOLUTE: 0.5 K/UL (ref 0.2–0.8)
MONOCYTES RELATIVE PERCENT: 4.9 %
NEUTROPHILS ABSOLUTE: 5.5 K/UL (ref 2–7.5)
NEUTROPHILS RELATIVE PERCENT: 58.4 %
PDW BLD-RTO: 12.2 % (ref 11–16)
PLATELET # BLD: 243 K/UL (ref 150–400)
PMV BLD AUTO: 10.9 FL (ref 6–10)
RBC # BLD: 4.69 M/UL (ref 3.8–5.8)
TSH REFLEX: 1.48 UIU/ML (ref 0.35–5.5)
WBC # BLD: 9.4 K/UL (ref 4–11)

## 2020-03-24 PROCEDURE — 84443 ASSAY THYROID STIM HORMONE: CPT

## 2020-03-24 PROCEDURE — 82784 ASSAY IGA/IGD/IGG/IGM EACH: CPT

## 2020-03-24 PROCEDURE — 83516 IMMUNOASSAY NONANTIBODY: CPT

## 2020-03-24 PROCEDURE — 85025 COMPLETE CBC W/AUTO DIFF WBC: CPT

## 2020-03-24 PROCEDURE — 86140 C-REACTIVE PROTEIN: CPT

## 2020-03-26 LAB — TISSUE TRANSGLUTAMINASE IGA: <0.5 U/ML (ref 0–14)

## 2020-06-19 RX ORDER — LISINOPRIL 20 MG/1
20 TABLET ORAL DAILY
Qty: 30 TABLET | Refills: 5 | Status: SHIPPED | OUTPATIENT
Start: 2020-06-19 | End: 2021-03-26 | Stop reason: SINTOL

## 2020-06-19 RX ORDER — ESTRADIOL 0.05 MG/D
1 PATCH TRANSDERMAL WEEKLY
Qty: 4 PATCH | Refills: 5 | Status: SHIPPED | OUTPATIENT
Start: 2020-06-19 | End: 2021-02-27

## 2020-06-19 RX ORDER — MELATONIN 5 MG
1 TABLET,CHEWABLE ORAL NIGHTLY
Qty: 30 TABLET | Refills: 5 | Status: SHIPPED | OUTPATIENT
Start: 2020-06-19

## 2020-06-19 RX ORDER — PANTOPRAZOLE SODIUM 40 MG/1
40 TABLET, DELAYED RELEASE ORAL DAILY
Qty: 30 TABLET | Refills: 5 | Status: SHIPPED | OUTPATIENT
Start: 2020-06-19 | End: 2021-03-26 | Stop reason: SDUPTHER

## 2020-06-19 RX ORDER — HYDROCHLOROTHIAZIDE 12.5 MG/1
12.5 CAPSULE, GELATIN COATED ORAL DAILY
Qty: 30 CAPSULE | Refills: 5 | Status: SHIPPED | OUTPATIENT
Start: 2020-06-19 | End: 2020-12-03

## 2020-07-01 ENCOUNTER — OFFICE VISIT (OUTPATIENT)
Dept: ORTHOPEDIC SURGERY | Facility: CLINIC | Age: 55
End: 2020-07-01

## 2020-07-01 VITALS — WEIGHT: 164 LBS | HEIGHT: 61 IN | BODY MASS INDEX: 30.96 KG/M2 | RESPIRATION RATE: 18 BRPM

## 2020-07-01 DIAGNOSIS — M65.30 TRIGGER FINGER, ACQUIRED: Primary | ICD-10-CM

## 2020-07-01 PROCEDURE — 99214 OFFICE O/P EST MOD 30 MIN: CPT | Performed by: ORTHOPAEDIC SURGERY

## 2020-07-01 RX ORDER — PANTOPRAZOLE SODIUM 40 MG/1
40 TABLET, DELAYED RELEASE ORAL DAILY
COMMUNITY

## 2020-07-01 RX ORDER — HYDROCHLOROTHIAZIDE 12.5 MG/1
12.5 CAPSULE, GELATIN COATED ORAL DAILY
COMMUNITY

## 2020-07-01 NOTE — PROGRESS NOTES
Subjective   Patient ID: Adelia Tamayo is a 55 y.o. female  Follow-up and Pain of the Left Hand (Patient is here today to possibly discuss options of fixing her trigger finger. 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  Denies fall injury recent trauma, denies numbness tingling loss of motion of the wrist or triggering of other fingers.      Review of Systems   Constitutional: Negative for fever.   HENT: Negative for dental problem and voice change.    Eyes: Negative for visual disturbance.   Respiratory: Negative for shortness of breath.    Cardiovascular: Negative for chest pain.   Gastrointestinal: Negative for abdominal pain.   Genitourinary: Negative for dysuria.   Musculoskeletal: Positive for arthralgias. Negative for gait problem and joint swelling.   Skin: Negative for rash.   Neurological: Negative for speech difficulty.   Hematological: Does not bruise/bleed easily.   Psychiatric/Behavioral: Negative for confusion.       Past Medical History:   Diagnosis Date   • Body piercing     Both ears, nose   • Cataract    • Fibromyalgia    • History of being tatooed     x's 1        Past Surgical History:   Procedure Laterality Date   • CARPAL TUNNEL RELEASE Left    •  SECTION      x's 3   • CHOLECYSTECTOMY     • COLONOSCOPY N/A 2020    Procedure: COLONOSCOPY WITH HOT FORCEP POLYPECTOMY;  Surgeon: Moises Bonilla MD;  Location: [facility] ENDOSCOPY;  Service: Gastroenterology   • D&C AND LAPAROSCOPY     • KNEE SURGERY Right        History reviewed. No pertinent family history.    Social History     Socioeconomic History   • Marital status:      Spouse name: Not on file   • Number of children: Not on file   • Years of education: Not  "on file   • Highest education level: Not on file   Tobacco Use   • Smoking status: Current Every Day Smoker     Packs/day: 0.25   • Smokeless tobacco: Never Used   Substance and Sexual Activity   • Alcohol use: No     Frequency: Never   • Drug use: Yes     Types: Marijuana   • Sexual activity: Defer       I have reviewed the medical and surgical history, family history, social history, medications, and/or allergies, and the review of systems of this report.    Allergies   Allergen Reactions   • Calcium Carbonate-Charcoal Unknown - High Severity     Rattle snake bite at 19 mo  Other reaction(s): Unknown - High Severity  Rattle snake bite at 19 mo         Current Outpatient Medications:   •  bisacodyl (DULCOLAX) 5 MG EC tablet, Take 2 @ 3pm, 2 @ 7 pm day prior to colonoscopy, Disp: 4 tablet, Rfl: 0  •  Cholecalciferol (VITAMIN D3) 82886 units capsule, Take 50,000 Units by mouth Daily., Disp: , Rfl:   •  estradiol (CLIMARA) 0.05 MG/24HR patch, Place 1 patch on the skin as directed by provider 1 (One) Time Per Week., Disp: , Rfl:   •  progesterone (PROMETRIUM) 100 MG capsule, Take 100 mg by mouth Daily., Disp: , Rfl:     Objective   Resp 18   Ht 154.9 cm (61\")   Wt 74.4 kg (164 lb)   BMI 30.99 kg/m²    Physical Exam  Constitutional: Patient is oriented to person, place, and time. Patient appears well-developed and well-nourished.   HENT:Head: Normocephalic and atraumatic.   Eyes: EOM are normal. Pupils are equal, round, and reactive to light.   Neck: Normal range of motion. Neck supple.   Cardiovascular: Normal rate.    Pulmonary/Chest: Effort normal and breath sounds normal.   Abdominal: Soft.   Neurological: Patient is alert and oriented to person, place, and time.   Skin: Skin is warm and dry.   Psychiatric: Patient has a normal mood and affect.   Nursing note and vitals reviewed.       [unfilled]   Left hand: Positive tenderness over the A1 pulley of the left hand long finger, full passive extension " neurovascular status intact full painless wrist range of motion.  No sign of significant interossei atrophy weakness or instability.    Assessment/Plan   Review of Radiographic Studies:    No new imaging done today.      Procedures     Adelia was seen today for follow-up and pain.    Diagnoses and all orders for this visit:    Trigger finger, acquired       Orthopedic activities reviewed and patient expressed appreciation, Risk, benefits, and merits of treatment alternatives reviewed with the patient and questions answered, The nature of the proposed surgery reviewed with the patient including risks, benefits, rehabilitation, recovery timeframe, and outcome expectations and Using illustrations and models, the nature of the pathology was explained to the patient      Recommendations/Plan:   Surgery: Left hand long finger trigger finger release    Patient agreeable to call or return sooner for any concerns.         I discussed with the patient the diagnosis, condition, natural history and treatment alternatives, both surgical and nonsurgical.  I reviewed the surgical procedural details using models, diagrams and reviewing x-ray findings.  I explained the nature of the operation, anesthesia methods and the postoperative recovery.  I explained risks and complications including but not limited to infection, bleeding, blood clotting, poor healing, chronic pain, stiffness, failure of the procedure, possible recurrence of the condition and anesthetic related risks.  The patient had opportunity to ask questions which were all answered to their satisfaction and decided to proceed with the plan for surgery.  I believe informed consent to proceed with the surgery was given verbally in my presence today.  The surgical consent form will be signed in the presence of the nursing staff prior to the surgery.    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

## 2020-07-09 ENCOUNTER — OFFICE VISIT (OUTPATIENT)
Dept: ORTHOPEDIC SURGERY | Facility: CLINIC | Age: 55
End: 2020-07-09

## 2020-07-09 VITALS — RESPIRATION RATE: 18 BRPM | WEIGHT: 164 LBS | BODY MASS INDEX: 30.96 KG/M2 | HEIGHT: 61 IN

## 2020-07-09 DIAGNOSIS — M65.332 TRIGGER MIDDLE FINGER OF LEFT HAND: Primary | ICD-10-CM

## 2020-07-09 PROCEDURE — 99213 OFFICE O/P EST LOW 20 MIN: CPT | Performed by: ORTHOPAEDIC SURGERY

## 2020-07-09 PROCEDURE — 20550 NJX 1 TENDON SHEATH/LIGAMENT: CPT | Performed by: ORTHOPAEDIC SURGERY

## 2020-07-09 RX ORDER — LIDOCAINE HYDROCHLORIDE 10 MG/ML
5 INJECTION, SOLUTION INFILTRATION; PERINEURAL
Status: COMPLETED | OUTPATIENT
Start: 2020-07-09 | End: 2020-07-09

## 2020-07-09 RX ADMIN — LIDOCAINE HYDROCHLORIDE 5 MG: 10 INJECTION, SOLUTION INFILTRATION; PERINEURAL at 13:11

## 2020-07-09 NOTE — PROGRESS NOTES
Subjective   Patient ID: Adelia Tamayo is a 55 y.o. female  Follow-up and Pain of the Left Hand (Patient is here today for increased pain in her hand, patient states she cancelled surgery last year due to her work schedule.)             History of Present Illness  Right-hand-dominant female with recurrent painful triggering of the left nondominant long finger, she was scheduled for surgery but canceled it due to her work schedule not allowing her to take time off she would like to discuss further options for her recurrent left long finger pain.  Has had short-term relief with injections in the past, no new falls or injury since I last saw her, no complaints of paresthesias loss of wrist range of motion or other acute medical illness      Review of Systems   Constitutional: Negative for fever.   HENT: Negative for dental problem and voice change.    Eyes: Negative for visual disturbance.   Respiratory: Negative for shortness of breath.    Cardiovascular: Negative for chest pain.   Gastrointestinal: Negative for abdominal pain.   Genitourinary: Negative for dysuria.   Musculoskeletal: Positive for arthralgias. Negative for gait problem and joint swelling.   Skin: Negative for rash.   Neurological: Negative for speech difficulty.   Hematological: Does not bruise/bleed easily.   Psychiatric/Behavioral: Negative for confusion.       Past Medical History:   Diagnosis Date   • Body piercing     Both ears, nose   • Cataract    • Fibromyalgia    • History of being tatooed     x's 1        Past Surgical History:   Procedure Laterality Date   • CARPAL TUNNEL RELEASE Left    •  SECTION      x's 3   • CHOLECYSTECTOMY     • COLONOSCOPY N/A 2020    Procedure: COLONOSCOPY WITH HOT FORCEP POLYPECTOMY;  Surgeon: Moises Bonilla MD;  Location: [facility] ENDOSCOPY;  Service: Gastroenterology   • D&C AND LAPAROSCOPY     • KNEE SURGERY Right        History reviewed. No pertinent family history.    Social History  "    Socioeconomic History   • Marital status:      Spouse name: Not on file   • Number of children: Not on file   • Years of education: Not on file   • Highest education level: Not on file   Tobacco Use   • Smoking status: Current Every Day Smoker     Packs/day: 0.25   • Smokeless tobacco: Never Used   Substance and Sexual Activity   • Alcohol use: No     Frequency: Never   • Drug use: Yes     Types: Marijuana   • Sexual activity: Defer       I have reviewed the medical and surgical history, family history, social history, medications, and/or allergies, and the review of systems of this report.    Allergies   Allergen Reactions   • Calcium Carbonate-Charcoal Unknown - High Severity     Rattle snake bite at 19 mo  Other reaction(s): Unknown - High Severity  Rattle snake bite at 19 mo         Current Outpatient Medications:   •  Cholecalciferol (VITAMIN D3) 24801 units capsule, Take 50,000 Units by mouth Daily., Disp: , Rfl:   •  DICLOFENAC PO, Take  by mouth., Disp: , Rfl:   •  estradiol (CLIMARA) 0.05 MG/24HR patch, Place 1 patch on the skin as directed by provider 1 (One) Time Per Week., Disp: , Rfl:   •  hydroCHLOROthiazide (MICROZIDE) 12.5 MG capsule, Take 12.5 mg by mouth Daily., Disp: , Rfl:   •  pantoprazole (PROTONIX) 40 MG EC tablet, Take 40 mg by mouth Daily., Disp: , Rfl:   •  progesterone (PROMETRIUM) 100 MG capsule, Take 100 mg by mouth Daily., Disp: , Rfl:     Objective   Resp 18   Ht 154.9 cm (61\")   Wt 74.4 kg (164 lb)   BMI 30.99 kg/m²    Physical Exam  Constitutional: Patient is oriented to person, place, and time. Patient appears well-developed and well-nourished.   HENT:Head: Normocephalic and atraumatic.   Eyes: EOM are normal. Pupils are equal, round, and reactive to light.   Neck: Normal range of motion. Neck supple.   Cardiovascular: Normal rate.    Pulmonary/Chest: Effort normal and breath sounds normal.   Abdominal: Soft.   Neurological: Patient is alert and oriented to person, " "place, and time.   Skin: Skin is warm and dry.   Psychiatric: Patient has a normal mood and affect.   Nursing note and vitals reviewed.       [unfilled]   Left hand: Point tenderness over the A1 pulley area of the long finger no cutaneous atrophy or significant loss of passive range of motion at the PIP or DIP joint, painful triggering is present with active flexion over the A1 pulley.  Assessment/Plan   Review of Radiographic Studies:    No new imaging done today.      Left long trigger finger  Date/Time: 7/9/2020 1:11 PM  Performed by: Miah Javier MD  Authorized by: Miah Javier MD   Consent: Verbal consent obtained.  Risks and benefits: risks, benefits and alternatives were discussed  Consent given by: patient  Patient understanding: patient states understanding of the procedure being performed  Patient consent: the patient's understanding of the procedure matches consent given  Procedure consent: procedure consent matches procedure scheduled  Relevant documents: relevant documents present and verified  Test results: test results available and properly labeled  Site marked: the operative site was marked  Imaging studies: imaging studies available  Required items: required blood products, implants, devices, and special equipment available  Patient identity confirmed: verbally with patient  Time out: Immediately prior to procedure a \"time out\" was called to verify the correct patient, procedure, equipment, support staff and site/side marked as required.  Preparation: Patient was prepped and draped in the usual sterile fashion.  Patient tolerance: Patient tolerated the procedure well with no immediate complications  Comments: 0.5cc triamcinolone 40mg per 1mL  [id number] lot#[id number] exp-11/01/2021  Medications administered: 5 mg lidocaine 1 %           Adelia was seen today for follow-up and pain.    Diagnoses and all orders for this visit:    Trigger middle finger of left hand  -     left long " trigger finger       Orthopedic activities reviewed and patient expressed appreciation, Risk, benefits, and merits of treatment alternatives reviewed with the patient and questions answered and Using illustrations and models, the nature of the pathology was explained to the patient      Recommendations/Plan:   Work/Activity Status: May perform usual activities as tolerated    Patient agreeable to call or return sooner for any concerns.         I discussed with the patient the diagnosis, condition, natural history and treatment alternatives, both surgical and nonsurgical.  I reviewed the surgical procedural details using models, diagrams and reviewing x-ray findings.  I explained the nature of the operation, anesthesia methods and the postoperative recovery.  I explained risks and complications including but not limited to infection, bleeding, blood clotting, poor healing, chronic pain, stiffness, failure of the procedure, possible recurrence of the condition and anesthetic related risks.  The patient had opportunity to ask questions which were all answered to their satisfaction and decided to proceed with the plan for surgery.  I believe informed consent to proceed with the surgery was given verbally in my presence today.  The surgical consent form will be signed in the presence of the nursing staff prior to the surgery.    Impression:  Painful left long finger trigger finger  Plan:  She will call to schedule left long finger trigger finger release in the future if not satisfied with this injection or if recurrence occurs.

## 2020-08-11 ENCOUNTER — OFFICE VISIT (OUTPATIENT)
Dept: PRIMARY CARE CLINIC | Age: 55
End: 2020-08-11
Payer: MEDICAID

## 2020-08-11 VITALS — TEMPERATURE: 98.5 F

## 2020-08-11 LAB — S PYO AG THROAT QL: NORMAL

## 2020-08-11 PROCEDURE — G8417 CALC BMI ABV UP PARAM F/U: HCPCS | Performed by: NURSE PRACTITIONER

## 2020-08-11 PROCEDURE — G8427 DOCREV CUR MEDS BY ELIG CLIN: HCPCS | Performed by: NURSE PRACTITIONER

## 2020-08-11 PROCEDURE — 3017F COLORECTAL CA SCREEN DOC REV: CPT | Performed by: NURSE PRACTITIONER

## 2020-08-11 PROCEDURE — 4004F PT TOBACCO SCREEN RCVD TLK: CPT | Performed by: NURSE PRACTITIONER

## 2020-08-11 PROCEDURE — 87880 STREP A ASSAY W/OPTIC: CPT | Performed by: NURSE PRACTITIONER

## 2020-08-11 PROCEDURE — 99000 SPECIMEN HANDLING OFFICE-LAB: CPT | Performed by: NURSE PRACTITIONER

## 2020-08-11 PROCEDURE — 99213 OFFICE O/P EST LOW 20 MIN: CPT | Performed by: NURSE PRACTITIONER

## 2020-08-11 RX ORDER — TIZANIDINE 4 MG/1
4 TABLET ORAL EVERY 6 HOURS PRN
Qty: 30 TABLET | Refills: 0 | Status: SHIPPED | OUTPATIENT
Start: 2020-08-11 | End: 2021-03-26 | Stop reason: ALTCHOICE

## 2020-08-11 RX ORDER — PREDNISONE 20 MG/1
20 TABLET ORAL 2 TIMES DAILY
Qty: 10 TABLET | Refills: 0 | Status: SHIPPED | OUTPATIENT
Start: 2020-08-11 | End: 2020-08-16

## 2020-08-11 RX ORDER — AZITHROMYCIN 250 MG/1
250 TABLET, FILM COATED ORAL SEE ADMIN INSTRUCTIONS
Qty: 6 TABLET | Refills: 0 | Status: SHIPPED | OUTPATIENT
Start: 2020-08-11 | End: 2020-08-16

## 2020-08-11 NOTE — PROGRESS NOTES
SUBJECTIVE:    Patient ID: Tavo Borrego is a 63 y. o.female. Chief Complaint   Patient presents with    Pharyngitis    Generalized Body Aches         HPI:    Patient presents to clinic with complaints of severe sore throat and symptoms started 2 days ago. Associated symptoms include headache, fatigue, trouble swallowing, body aches, eye redness. 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 Denies CP, palpitations, SOB, rash, n/v/d, abdominal pain. No relieving or worsening factors reported. Treatment regimens include OTC meds with no relief. Patient's medications, allergies, past medical, surgical, social and family histories were reviewed and updated as appropriate in electronic medical record. No outpatient medications have been marked as taking for the 20 encounter (Office Visit) with EMIL Calderon CNP. Review of Systems   Constitutional: Positive for fatigue. Negative for chills and fever. HENT: Positive for congestion, postnasal drip, sinus pressure, sinus pain and sore throat. Negative for ear discharge, ear pain and trouble swallowing. Eyes: Positive for redness. Negative for pain and visual disturbance. Respiratory: Positive for cough. Negative for shortness of breath and wheezing. Cardiovascular: Negative for chest pain and palpitations. Gastrointestinal: Negative for abdominal pain, diarrhea and nausea. Musculoskeletal: Positive for arthralgias and myalgias. Negative for back pain. Neurological: Positive for headaches. Negative for dizziness and light-headedness.        Past Medical History:   Diagnosis Date    Connective tissue disease ([address]     Fibromyalgia     Hyperlipidemia     Hypertension     Osteoarthritis     Osteomalacia     Poisonous snake bite 25 mos old    swamp rattler    RLS (restless legs syndrome)      Past Surgical History:   Procedure Laterality Date    CARPAL TUNNEL RELEASE Left      SECTION 0104,[id number],9761    CHOLECYSTECTOMY      DILATION AND CURETTAGE OF UTERUS      KNEE SURGERY Right 2008    OTHER SURGICAL HISTORY      DNC x2     Family History   Problem Relation Age of Onset    High Blood Pressure Mother     Arthritis Mother         KALEN Guthrie COPD Mother     Emphysema Mother     Other Father         parkinson's disease    Dementia Father     High Blood Pressure Brother       Social History     Tobacco Use   Smoking Status Light Tobacco Smoker    Packs/day: 0.25    Years: 15.00    Pack years: 3.75   Smokeless Tobacco Never Used       OBJECTIVE:   Wt Readings from Last 3 Encounters:   03/10/20 163 lb 3.2 oz (74 kg)   02/24/20 167 lb (75.8 kg)   10/04/19 170 lb 6.4 oz (77.3 kg)     BP Readings from Last 3 Encounters:   03/10/20 (!) 153/92   02/24/20 130/84   10/04/19 (!) 140/80       Temp 98.5 °F (36.9 °C) (Temporal)      Physical Exam  Vitals signs and nursing note reviewed. Constitutional:       Appearance: She is well-developed. She is ill-appearing. HENT:      Head: Normocephalic. Right Ear: External ear normal.      Left Ear: External ear normal.      Nose: Mucosal edema and congestion present. Mouth/Throat:      Lips: Pink. Mouth: Mucous membranes are moist.      Pharynx: Uvula midline. Pharyngeal swelling and posterior oropharyngeal erythema present. No oropharyngeal exudate. Tonsils: No tonsillar exudate or tonsillar abscesses. Eyes:      Extraocular Movements: Extraocular movements intact. Pupils: Pupils are equal, round, and reactive to light. Comments: Slight eye redness present bilateral    Neck:      Musculoskeletal: Normal range of motion and neck supple. Cardiovascular:      Rate and Rhythm: Normal rate and regular rhythm. Pulmonary:      Effort: Pulmonary effort is normal. No respiratory distress. Breath sounds: No wheezing. Lymphadenopathy:      Cervical: No cervical adenopathy. Skin:     General: Skin is warm and dry. Neurological:      General: No focal deficit present. Mental Status: She is alert and oriented to person, place, and time. Psychiatric:         Mood and Affect: Mood normal.         Behavior: Behavior normal.         Lab Results   Component Value Date     02/24/2020    K 4.5 02/24/2020     02/24/2020    CO2 26 02/24/2020    GLUCOSE 83 02/24/2020    BUN 14 02/24/2020    CREATININE 0.9 02/24/2020    CALCIUM 9.3 02/24/2020    PROT 6.8 02/24/2020    LABALBU 4.4 02/24/2020    BILITOT 0.4 02/24/2020    ALT 13 02/24/2020    AST 17 02/24/2020       LDL Calculated (mg/dL)   Date Value   02/24/2020 128         Lab Results   Component Value Date    WBC 9.4 03/24/2020    NEUTROABS 5.5 03/24/2020    HGB 13.9 03/24/2020    HCT 41.8 03/24/2020    MCV 89.1 03/24/2020     03/24/2020       No results found for: TSH      ASSESSMENT/PLAN:     1. Sore throat  Covid test. Take medications as prescribed. Continue home medications. Discussed symptom management such as tylenol PRN, increase fluids, rest. Return to clinic or go to ED if S&S worsen or no improvement noted. Patient verbalized understanding.     - ND HANDLG&/OR CONVEY OF SPEC FOR TR OFFICE TO LAB  - POCT rapid strep A  - azithromycin (ZITHROMAX) 250 MG tablet; Take 1 tablet by mouth See Admin Instructions for 5 days 500mg on day 1 followed by 250mg on days 2 - 5  Dispense: 6 tablet; Refill: 0    2. Body aches  See 1.    - ND HANDLG&/OR CONVEY OF SPEC FOR TR OFFICE TO LAB  - predniSONE (DELTASONE) 20 MG tablet; Take 1 tablet by mouth 2 times daily for 5 days  Dispense: 10 tablet; Refill: 0  - tiZANidine (ZANAFLEX) 4 MG tablet; Take 1 tablet by mouth every 6 hours as needed (body aches)  Dispense: 30 tablet; Refill: 0    3. Educated about COVID-19 virus infection  Pt verbalized understanding.     - ND HANDLG&/OR CONVEY OF SPEC FOR TR OFFICE TO LAB    4. Suspected COVID-19 virus infection  Covid test. Will follow. Education provided.      - predniSONE (DELTASONE) 20 MG tablet; Take 1 tablet by mouth 2 times daily for 5 days  Dispense: 10 tablet; Refill: 0  - tiZANidine (ZANAFLEX) 4 MG tablet; Take 1 tablet by mouth every 6 hours as needed (body aches)  Dispense: 30 tablet;  Refill: 0        Orders Placed This Encounter   Medications    azithromycin (ZITHROMAX) 250 MG tablet     Sig: Take 1 tablet by mouth See Admin Instructions for 5 days 500mg on day 1 followed by 250mg on days 2 - 5     Dispense:  6 tablet     Refill:  0    predniSONE (DELTASONE) 20 MG tablet     Sig: Take 1 tablet by mouth 2 times daily for 5 days     Dispense:  10 tablet     Refill:  0    tiZANidine (ZANAFLEX) 4 MG tablet     Sig: Take 1 tablet by mouth every 6 hours as needed (body aches)     Dispense:  30 tablet     Refill:  0

## 2020-08-12 ASSESSMENT — ENCOUNTER SYMPTOMS
COUGH: 1
SHORTNESS OF BREATH: 0
SINUS PAIN: 1
EYE REDNESS: 1
ABDOMINAL PAIN: 0
WHEEZING: 0
DIARRHEA: 0
SINUS PRESSURE: 1
TROUBLE SWALLOWING: 0
BACK PAIN: 0
NAUSEA: 0
EYE PAIN: 0
SORE THROAT: 1

## 2020-08-25 ENCOUNTER — TELEPHONE (OUTPATIENT)
Dept: PRIMARY CARE CLINIC | Age: 55
End: 2020-08-25

## 2020-11-14 NOTE — TELEPHONE ENCOUNTER
Pts daughter reports that pts bp has been running 150's/100's, asking for advice full weight-bearing

## 2020-12-03 RX ORDER — HYDROCHLOROTHIAZIDE 12.5 MG/1
12.5 CAPSULE, GELATIN COATED ORAL DAILY
Qty: 30 CAPSULE | Refills: 5 | Status: SHIPPED | OUTPATIENT
Start: 2020-12-03 | End: 2021-08-16

## 2021-02-27 RX ORDER — ESTRADIOL 0.05 MG/D
PATCH TRANSDERMAL
Qty: 4 PATCH | Refills: 5 | Status: SHIPPED | OUTPATIENT
Start: 2021-02-27

## 2021-03-25 NOTE — PROGRESS NOTES
Health Maintenance Due This Visit   Colonoscopy No   Mammogram No   Annual Wellness Visit No   Microalbumin No   HgbA1C No   Diabetic Eye Exam No    House Bill One Due This Visit   KIEL No   UDS No   Contract No

## 2021-03-25 NOTE — PATIENT INSTRUCTIONS
The medication list included in this document is our record of what you are currently taking, including any changes that were made at today's visit.  If you find any differences when compared to your medications at home, or have any questions that were not answered at your visit, please contact the office. · Keep a list of your medicines with you. List all of the prescription medicines, nonprescription medicines, supplements, natural remedies, and vitamins that you take. Tell your healthcare providers who treat you about all of the products you are taking. Your provider can provide you with a form to keep track of them. Just ask. · Follow the directions that come with your medicine, including information about food or alcohol. Make sure you know how and when to take your medicine. Do not take more or less than you are supposed to take. · Keep all medicines out of the reach of children. · Store medicines according to the directions on the label. · Monitor yourself. Learn to know how your body reacts to your new medicine and keep track of how it makes you feel before attempting (If your provider has allowed you to do so) to drive or go to work. · Seek emergency medical attention if you think you have used too much of this medicine. An overdose of any prescription medicine can be fatal. Overdose symptoms may include extreme drowsiness, muscle weakness, confusion, cold and clammy skin, pinpoint pupils, shallow breathing, slow heart rate, fainting, or coma. · Don't share prescription medicines with others, even when they seem to have the same symptoms. What may be good for you may be harmful to others. · If you are no longer taking a prescribed medication and you have pills left please take your pills out of their original containers. Mix crushed pills with an undesirable substance, such as cat litter or used coffee grounds.  Put the mixture into a disposable container with a lid, such as an empty margarine tub, conditions   Hearing loss   Erectile dysfunction   Dementia   Osteoporosis   Here's How   Once you've decided to quit smoking, set your target quit date a few weeks away. In the time leading up to your quit day, try some of these ideas offered by the [address] to help you successfully quit smoking. For the best results, work with your doctor. Together, you can test your lung function and compare the results to those of a nonsmoking person. The results can be given to you as your lung age. Finding out your lung age right after having the test done may help you to stop smoking. Your doctor can also discuss with you all of your options and refer you to smoking-cessation support groups. You may wish to use nicotine replacement (gum, patches, inhaler) or one of the prescription medications that have been shown to increase quit rates and prolong abstinence from smoking. But whatever you and your doctor decide on these matters, it will still be you who decides when an how to quit. Here are some techniques:   Switch Brands   Switch to a brand you find distasteful. Change to a brand that is low in tar and nicotine a couple of weeks before your target quit date. This will help change your smoking behavior. However, do not smoke more cigarettes, inhale them more often or more deeply, or place your fingertips over the holes in the filters. All of these actions will increase your nicotine intake, and the idea is to get your body used to functioning without nicotine. Cut Down the Number of Cigarettes You Smoke   Smoke only half of each cigarette. Each day, postpone the lighting of your first cigarette by one hour. Decide you'll only smoke during odd or even hours of the day. Decide beforehand how many cigarettes you'll smoke during the day. For each additional cigarette, give a dollar to your favorite tejas. Change your eating habits to help you cut down. For example, drink milk, which many people consider incompatible with smoking. End meals or snacks with something that won't lead to a cigarette. Reach for a glass of juice instead of a cigarette for a \"pick-me-up. \"   Remember: Cutting down can help you quit, but it's not a substitute for quitting. If you're down to about seven cigarettes a day, it's time to set your target quit date, and get ready to stick to it. Don't Smoke \"Automatically\"   Smoke only those cigarettes you really want. Catch yourself before you light up a cigarette out of pure habit. Don't empty your ashtrays. This will remind you of how many cigarettes you've smoked each day, and the sight and the smell of stale cigarettes butts will be very unpleasant. Make yourself aware of each cigarette by using the opposite hand or putting cigarettes in an unfamiliar location or a different pocket to break the automatic reach. If you light up many times during the day without even thinking about it, try to look in a mirror each time you put a match to your cigarette. You may decide you don't need it. Make Smoking Inconvenient   Stop buying cigarettes by the carton. Wait until one pack is empty before you buy another. Stop carrying cigarettes with you at home or at work. Make them difficult to get to. Make Smoking Unpleasant   Smoke only under circumstances that aren't especially pleasurable for you. If you like to smoke with others, smoke alone. Turn your chair to an empty corner and focus only on the cigarette you are smoking and all its many negative effects. Collect all your cigarette butts in one large glass container as a visual reminder of the filth made by smoking. Just Before Quitting   Practice going without cigarettes. Don't think of never smoking again. Think of quitting in terms of one day at a time . Tell yourself you won't smoke today, and then don't.    Clean your clothes to rid them of the cigarette smell, which can linger a long time. On the Day You Quit   Throw away all your cigarettes and matches. Hide your lighters and ashtrays. Visit the dentist and have your teeth cleaned to get rid of tobacco stains. Notice how nice they look and resolve to keep them that way. Make a list of things you'd like to buy for yourself or someone else. Estimate the cost in terms of packs of cigarettes, and put the money aside to buy these presents. Keep very busy on the big day. Go to the movies, exercise, take long walks, or go bike riding. Remind your family and friends that this is your quit date, and ask them to help you over the rough spots of the first couple of days and weeks. Buy yourself a treat or do something special to celebrate. Telephone and Internet Support   Telephone, web-, and computer-based programs can offer you the support that you need to quit and to stay smoke-free. You can find many programs online, like the [employer]'s [place] from Smoking . Immediately After Quitting   Develop a clean, fresh, nonsmoking environment around yourselfat work and at home. Buy yourself flowersyou may be surprised how much you can enjoy their scent now. The first few days after you quit, spend as much free time as possible in places where smoking isn't allowed, such as [address] [place] [address], museums, theaters, department stores, and churches. Drink large quantities of water and fruit juice (but avoid sodas that contain caffeine). Try to avoid alcohol, coffee, and other beverages that you associate with cigarette smoking. Strike up conversation instead of a match for a cigarette. If you miss the sensation of having a cigarette in your hand, play with something elsea pencil, a paper clip, a marble. If you miss having something in your mouth, try toothpicks or a fake cigarette.

## 2021-03-26 ENCOUNTER — HOSPITAL ENCOUNTER (OUTPATIENT)
Facility: HOSPITAL | Age: 56
Discharge: HOME | End: 2021-03-26
Payer: MEDICAID

## 2021-03-26 ENCOUNTER — OFFICE VISIT (OUTPATIENT)
Dept: PRIMARY CARE CLINIC | Age: 56
End: 2021-03-26
Payer: MEDICAID

## 2021-03-26 VITALS
SYSTOLIC BLOOD PRESSURE: 130 MMHG | BODY MASS INDEX: 31.15 KG/M2 | HEIGHT: 61 IN | WEIGHT: 165 LBS | HEART RATE: 72 BPM | DIASTOLIC BLOOD PRESSURE: 80 MMHG | OXYGEN SATURATION: 98 % | TEMPERATURE: 98.1 F

## 2021-03-26 DIAGNOSIS — E55.9 VITAMIN D DEFICIENCY: ICD-10-CM

## 2021-03-26 DIAGNOSIS — M19.042 OSTEOARTHRITIS OF LEFT HAND, UNSPECIFIED OSTEOARTHRITIS TYPE: Primary | ICD-10-CM

## 2021-03-26 DIAGNOSIS — H02.824 CYST OF LEFT UPPER EYELID: ICD-10-CM

## 2021-03-26 DIAGNOSIS — Z12.31 ENCOUNTER FOR SCREENING MAMMOGRAM FOR BREAST CANCER: ICD-10-CM

## 2021-03-26 DIAGNOSIS — M79.7 FIBROMYALGIA: ICD-10-CM

## 2021-03-26 DIAGNOSIS — I10 ESSENTIAL HYPERTENSION: ICD-10-CM

## 2021-03-26 LAB
A/G RATIO: 2 (ref 0.8–2)
ALBUMIN SERPL-MCNC: 4.4 G/DL (ref 3.4–4.8)
ALP BLD-CCNC: 95 U/L (ref 25–100)
ALT SERPL-CCNC: 7 U/L (ref 4–36)
ANION GAP SERPL CALCULATED.3IONS-SCNC: 8 MMOL/L (ref 3–16)
AST SERPL-CCNC: 13 U/L (ref 8–33)
BASOPHILS ABSOLUTE: 0.1 K/UL (ref 0–0.1)
BASOPHILS RELATIVE PERCENT: 1.1 %
BILIRUB SERPL-MCNC: <0.2 MG/DL (ref 0.3–1.2)
BUN BLDV-MCNC: 16 MG/DL (ref 6–20)
CALCIUM SERPL-MCNC: 9.3 MG/DL (ref 8.5–10.5)
CHLORIDE BLD-SCNC: 105 MMOL/L (ref 98–107)
CHOLESTEROL, TOTAL: 200 MG/DL (ref 0–200)
CO2: 29 MMOL/L (ref 20–30)
CREAT SERPL-MCNC: 0.9 MG/DL (ref 0.4–1.2)
EOSINOPHILS ABSOLUTE: 0.3 K/UL (ref 0–0.4)
EOSINOPHILS RELATIVE PERCENT: 3.2 %
GFR AFRICAN AMERICAN: >59
GFR NON-AFRICAN AMERICAN: >60
GLOBULIN: 2.2 G/DL
GLUCOSE BLD-MCNC: 93 MG/DL (ref 74–106)
HCT VFR BLD CALC: 43 % (ref 37–47)
HDLC SERPL-MCNC: 58 MG/DL (ref 40–60)
HEMOGLOBIN: 14.2 G/DL (ref 11.5–16.5)
IMMATURE GRANULOCYTES #: 0 K/UL
IMMATURE GRANULOCYTES %: 0.2 % (ref 0–5)
LDL CHOLESTEROL CALCULATED: 100 MG/DL
LYMPHOCYTES ABSOLUTE: 2.3 K/UL (ref 1.5–4)
LYMPHOCYTES RELATIVE PERCENT: 29.2 %
MAGNESIUM: 2 MG/DL (ref 1.7–2.4)
MCH RBC QN AUTO: 31.1 PG (ref 27–32)
MCHC RBC AUTO-ENTMCNC: 33 G/DL (ref 31–35)
MCV RBC AUTO: 94.3 FL (ref 80–100)
MONOCYTES ABSOLUTE: 0.6 K/UL (ref 0.2–0.8)
MONOCYTES RELATIVE PERCENT: 7.1 %
NEUTROPHILS ABSOLUTE: 4.7 K/UL (ref 2–7.5)
NEUTROPHILS RELATIVE PERCENT: 59.2 %
PDW BLD-RTO: 12.4 % (ref 11–16)
PLATELET # BLD: 203 K/UL (ref 150–400)
PMV BLD AUTO: 10.9 FL (ref 6–10)
POTASSIUM SERPL-SCNC: 4.3 MMOL/L (ref 3.4–5.1)
RBC # BLD: 4.56 M/UL (ref 3.8–5.8)
SODIUM BLD-SCNC: 142 MMOL/L (ref 136–145)
TOTAL PROTEIN: 6.6 G/DL (ref 6.4–8.3)
TRIGL SERPL-MCNC: 211 MG/DL (ref 0–249)
VITAMIN D 25-HYDROXY: 31.3 (ref 32–100)
VLDLC SERPL CALC-MCNC: 42 MG/DL
WBC # BLD: 8 K/UL (ref 4–11)

## 2021-03-26 PROCEDURE — 4004F PT TOBACCO SCREEN RCVD TLK: CPT | Performed by: NURSE PRACTITIONER

## 2021-03-26 PROCEDURE — 82306 VITAMIN D 25 HYDROXY: CPT

## 2021-03-26 PROCEDURE — 3017F COLORECTAL CA SCREEN DOC REV: CPT | Performed by: NURSE PRACTITIONER

## 2021-03-26 PROCEDURE — 85025 COMPLETE CBC W/AUTO DIFF WBC: CPT

## 2021-03-26 PROCEDURE — G8427 DOCREV CUR MEDS BY ELIG CLIN: HCPCS | Performed by: NURSE PRACTITIONER

## 2021-03-26 PROCEDURE — G8417 CALC BMI ABV UP PARAM F/U: HCPCS | Performed by: NURSE PRACTITIONER

## 2021-03-26 PROCEDURE — 99214 OFFICE O/P EST MOD 30 MIN: CPT | Performed by: NURSE PRACTITIONER

## 2021-03-26 PROCEDURE — G8484 FLU IMMUNIZE NO ADMIN: HCPCS | Performed by: NURSE PRACTITIONER

## 2021-03-26 PROCEDURE — 80053 COMPREHEN METABOLIC PANEL: CPT

## 2021-03-26 PROCEDURE — 83735 ASSAY OF MAGNESIUM: CPT

## 2021-03-26 PROCEDURE — 80061 LIPID PANEL: CPT

## 2021-03-26 RX ORDER — FLUTICASONE PROPIONATE 50 MCG
2 SPRAY, SUSPENSION (ML) NASAL DAILY
Qty: 1 BOTTLE | Refills: 5 | Status: SHIPPED | OUTPATIENT
Start: 2021-03-26 | End: 2021-09-13

## 2021-03-26 RX ORDER — PANTOPRAZOLE SODIUM 40 MG/1
40 TABLET, DELAYED RELEASE ORAL DAILY
Qty: 30 TABLET | Refills: 5 | Status: SHIPPED | OUTPATIENT
Start: 2021-03-26 | End: 2021-11-09 | Stop reason: SDUPTHER

## 2021-03-26 RX ORDER — CELECOXIB 200 MG/1
200 CAPSULE ORAL 2 TIMES DAILY
Qty: 60 CAPSULE | Refills: 5 | Status: SHIPPED | OUTPATIENT
Start: 2021-03-26 | End: 2021-09-13

## 2021-03-26 ASSESSMENT — PATIENT HEALTH QUESTIONNAIRE - PHQ9
1. LITTLE INTEREST OR PLEASURE IN DOING THINGS: 0
SUM OF ALL RESPONSES TO PHQ QUESTIONS 1-9: 0
SUM OF ALL RESPONSES TO PHQ QUESTIONS 1-9: 0

## 2021-03-26 NOTE — PROGRESS NOTES
Chief Complaint   Patient presents with    Eye Problem    Hypertension       Have you seen any other physician or provider since your last visit no    Have you had any other diagnostic tests since your last visit? no    Have you changed or stopped any medications since your last visit? yes -          SUBJECTIVE:    Patient ID: Kristian Mina is a 61 y.o. female. Medical History Review  Past Medical, Family, and Social History reviewed. Health Maintenance Due   Topic Date Due    Hepatitis C screen  Never done    Pneumococcal 0-64 years Vaccine (1 of 1 - PPSV23) Never done    HIV screen  Never done    COVID-19 Vaccine (1) Never done    Cervical cancer screen  Never done    Shingles Vaccine (1 of 2) Never done    Flu vaccine (1) 09/01/2020    Breast cancer screen  01/21/2021       HPI:   Chief Complaint   Patient presents with    Eye Problem    Hypertension   She has quite a bit of joint pain. She would like to try a higher dose of Celebrex. She has developed a knot on her eyelid that is concerning her. Patient's medications, allergies, past medical, surgical, social and family histories were reviewed and updated as appropriate. Review of Systems   Constitutional: Negative for chills and fever. HENT: Negative for ear pain and sore throat. Eyes: Negative for pain and visual disturbance. Respiratory: Negative for cough and shortness of breath. Cardiovascular: Negative for chest pain, palpitations and leg swelling. Gastrointestinal: Negative for abdominal pain, nausea and vomiting. Genitourinary: Negative for dysuria and hematuria. Musculoskeletal: Positive for arthralgias. Negative for joint swelling. Skin: Negative for rash. Knot on left upper eyelid   Neurological: Negative for dizziness and weakness. Psychiatric/Behavioral: Negative for sleep disturbance. Reviewed and acurate. See MA note.     OBJECTIVE:  /80   Pulse 72   Temp 98.1 °F (36.7 °C)   Ht 5' 1\" (1.549 m)   Wt 165 lb (74.8 kg)   SpO2 98%   BMI 31.18 kg/m²    Physical Exam  Vitals signs reviewed. Constitutional:       General: She is not in acute distress. Appearance: She is well-developed. HENT:      Head: Normocephalic. Mouth/Throat:      Pharynx: No oropharyngeal exudate. Eyes:      General: Lids are normal.   Neck:      Musculoskeletal: Neck supple. Cardiovascular:      Rate and Rhythm: Normal rate and regular rhythm. Heart sounds: Normal heart sounds. Pulmonary:      Effort: Pulmonary effort is normal.      Breath sounds: Normal breath sounds. Abdominal:      General: Bowel sounds are normal. There is no distension. Palpations: Abdomen is soft. Tenderness: There is no abdominal tenderness. Lymphadenopathy:      Cervical: No cervical adenopathy. Skin:     General: Skin is warm and dry. Comments: Pea-sized cyst medial aspect of the left upper eyelid- opened with a sterile needle- small amount of white material expressed   Neurological:      Mental Status: She is alert and oriented to person, place, and time.          Results in Past 30 Days  Result Component Current Result Ref Range Previous Result Ref Range   Albumin/Globulin Ratio 2.0 (3/26/2021) 0.8 - 2.0 Not in Time Range    Albumin 4.4 (3/26/2021) 3.4 - 4.8 g/dL Not in Time Range    Alkaline Phosphatase 95 (3/26/2021) 25 - 100 U/L Not in Time Range    ALT 7 (3/26/2021) 4 - 36 U/L Not in Time Range    AST 13 (3/26/2021) 8 - 33 U/L Not in Time Range    BUN 16 (3/26/2021) 6 - 20 mg/dL Not in Time Range    Calcium 9.3 (3/26/2021) 8.5 - 10.5 mg/dL Not in Time Range    Chloride 105 (3/26/2021) 98 - 107 mmol/L Not in Time Range    CO2 29 (3/26/2021) 20 - 30 mmol/L Not in Time Range    CREATININE 0.9 (3/26/2021) 0.4 - 1.2 mg/dL Not in Time Range    GFR  >59 (3/26/2021) >59 Not in Time Range    GFR Non- >60 (3/26/2021) >59 Not in Time Range    Globulin 2.2 (3/26/2021) g/dL Not 4. Fibromyalgia    5. Cyst of left upper eyelid    6. Encounter for screening mammogram for breast cancer        PLAN:  Orders Placed This Encounter   Medications    pantoprazole (PROTONIX) 40 MG tablet     Sig: Take 1 tablet by mouth daily     Dispense:  30 tablet     Refill:  5    progesterone (PROMETRIUM) 100 MG capsule     Sig: take 1 capsule by mouth once daily     Dispense:  30 capsule     Refill:  5    fluticasone (FLONASE) 50 MCG/ACT nasal spray     Si sprays by Each Nostril route daily     Dispense:  1 Bottle     Refill:  5    diclofenac sodium (VOLTAREN) 1 % GEL     Sig: Apply 4 g topically 4 times daily as needed for Pain     Dispense:  100 g     Refill:  5    celecoxib (CELEBREX) 200 MG capsule     Sig: Take 1 capsule by mouth 2 times daily     Dispense:  60 capsule     Refill:  5     Orders Placed This Encounter   Procedures    YULI Digital Screen Bilateral [[id number]]    MAGNESIUM    CBC WITH AUTO DIFFERENTIAL    COMPREHENSIVE METABOLIC PANEL    VITAMIN D 25 HYDROXY    LIPID PANEL    Amb External Referral To Orthopedic Surgery     Patient Instructions   The medication list included in this document is our record of what you are currently taking, including any changes that were made at today's visit.  If you find any differences when compared to your medications at home, or have any questions that were not answered at your visit, please contact the office. · Keep a list of your medicines with you. List all of the prescription medicines, nonprescription medicines, supplements, natural remedies, and vitamins that you take. Tell your healthcare providers who treat you about all of the products you are taking. Your provider can provide you with a form to keep track of them. Just ask. · Follow the directions that come with your medicine, including information about food or alcohol. Make sure you know how and when to take your medicine.  Do not take more or less than you are supposed to take.  · Keep all medicines out of the reach of children. · Store medicines according to the directions on the label. · Monitor yourself. Learn to know how your body reacts to your new medicine and keep track of how it makes you feel before attempting (If your provider has allowed you to do so) to drive or go to work. · Seek emergency medical attention if you think you have used too much of this medicine. An overdose of any prescription medicine can be fatal. Overdose symptoms may include extreme drowsiness, muscle weakness, confusion, cold and clammy skin, pinpoint pupils, shallow breathing, slow heart rate, fainting, or coma. · Don't share prescription medicines with others, even when they seem to have the same symptoms. What may be good for you may be harmful to others. · If you are no longer taking a prescribed medication and you have pills left please take your pills out of their original containers. Mix crushed pills with an undesirable substance, such as cat litter or used coffee grounds. Put the mixture into a disposable container with a lid, such as an empty margarine tub, or into a sealable bag. Cover up or remove any of your personal information on the empty containers by covering it with black permanent marker or duct tape. Place the sealed container with the mixture, and the empty drug containers, in the trash. · If you use a medication that is in the form of a patch, dispose of used patches by folding them in half so that the sticky sides meet, and then flushing them down a toilet. They should not be placed in the household trash where children or pets can find them. · If you have any questions, ask your provider or pharmacist for more information. · Be sure to keep all appointments for provider visits or tests. We are committed to providing you with the best care possible.    In order to help us achieve these goals please remember to bring all medications, herbal products, and over the counter supplements with you to each visit. If your provider has ordered testing for you, please be sure to follow up with our office if you have not received results within 7 days after the testing took place. *If you receive a survey after visiting one of our offices, please take time to share your experience concerning your physician office visit. These surveys are confidential and no health information about you is shared. We are eager to improve for you and we are counting on your feedback to help make that happen. ips to Help You Stop Smoking       Cigarette smoking is a preventable cause of death in the [facility]. If you have thought about quitting but haven't been able to, here are some reasons why you should and some ways to do it. Here's Why   Quitting smoking now can decrease your risk of getting smoking-related illnesses like:   Heart disease   Stroke   Several types of cancer, including:   Lung   Mouth   Esophagus   Larynx   Bladder   Pancreas   Kidney   Chronic lung diseases:   Bronchitis   Emphysema   Asthma   Cataracts   Macular degeneration   Thyroid conditions   Hearing loss   Erectile dysfunction   Dementia   Osteoporosis   Here's How   Once you've decided to quit smoking, set your target quit date a few weeks away. In the time leading up to your quit day, try some of these ideas offered by the [address] to help you successfully quit smoking. For the best results, work with your doctor. Together, you can test your lung function and compare the results to those of a nonsmoking person. The results can be given to you as your lung age. Finding out your lung age right after having the test done may help you to stop smoking. Your doctor can also discuss with you all of your options and refer you to smoking-cessation support groups.  You may wish to use nicotine replacement (gum, patches, inhaler) or one of the prescription medications that have been shown to increase quit rates and prolong abstinence from smoking. But whatever you and your doctor decide on these matters, it will still be you who decides when an how to quit. Here are some techniques:   Switch Brands   Switch to a brand you find distasteful. Change to a brand that is low in tar and nicotine a couple of weeks before your target quit date. This will help change your smoking behavior. However, do not smoke more cigarettes, inhale them more often or more deeply, or place your fingertips over the holes in the filters. All of these actions will increase your nicotine intake, and the idea is to get your body used to functioning without nicotine. Cut Down the Number of Cigarettes You Smoke   Smoke only half of each cigarette. Each day, postpone the lighting of your first cigarette by one hour. Decide you'll only smoke during odd or even hours of the day. Decide beforehand how many cigarettes you'll smoke during the day. For each additional cigarette, give a dollar to your favorite tejas. Change your eating habits to help you cut down. For example, drink milk, which many people consider incompatible with smoking. End meals or snacks with something that won't lead to a cigarette. Reach for a glass of juice instead of a cigarette for a \"pick-me-up. \"   Remember: Cutting down can help you quit, but it's not a substitute for quitting. If you're down to about seven cigarettes a day, it's time to set your target quit date, and get ready to stick to it. Don't Smoke \"Automatically\"   Smoke only those cigarettes you really want. Catch yourself before you light up a cigarette out of pure habit. Don't empty your ashtrays. This will remind you of how many cigarettes you've smoked each day, and the sight and the smell of stale cigarettes butts will be very unpleasant.    Make yourself aware of each cigarette by using the opposite hand or putting cigarettes in an unfamiliar location or a different pocket to break the automatic reach. If you light up many times during the day without even thinking about it, try to look in a mirror each time you put a match to your cigarette. You may decide you don't need it. Make Smoking Inconvenient   Stop buying cigarettes by the carton. Wait until one pack is empty before you buy another. Stop carrying cigarettes with you at home or at work. Make them difficult to get to. Make Smoking Unpleasant   Smoke only under circumstances that aren't especially pleasurable for you. If you like to smoke with others, smoke alone. Turn your chair to an empty corner and focus only on the cigarette you are smoking and all its many negative effects. Collect all your cigarette butts in one large glass container as a visual reminder of the filth made by smoking. Just Before Quitting   Practice going without cigarettes. Don't think of never smoking again. Think of quitting in terms of one day at a time . Tell yourself you won't smoke today, and then don't. Clean your clothes to rid them of the cigarette smell, which can linger a long time. On the Day You Quit   Throw away all your cigarettes and matches. Hide your lighters and ashtrays. Visit the dentist and have your teeth cleaned to get rid of tobacco stains. Notice how nice they look and resolve to keep them that way. Make a list of things you'd like to buy for yourself or someone else. Estimate the cost in terms of packs of cigarettes, and put the money aside to buy these presents. Keep very busy on the big day. Go to the movies, exercise, take long walks, or go bike riding. Remind your family and friends that this is your quit date, and ask them to help you over the rough spots of the first couple of days and weeks. Buy yourself a treat or do something special to celebrate.    Telephone and Internet Support   Telephone, web-, and computer-based programs can offer you the support that you need to quit and to stay smoke-free. You can find many programs online, like the American [employer] Association's [place] from Smoking . Immediately After Quitting   Develop a clean, fresh, nonsmoking environment around yourselfat work and at home. Buy yourself flowersyou may be surprised how much you can enjoy their scent now. The first few days after you quit, spend as much free time as possible in places where smoking isn't allowed, such as [address] [place] [address], museums, theaters, department stores, and churches. Drink large quantities of water and fruit juice (but avoid sodas that contain caffeine). Try to avoid alcohol, coffee, and other beverages that you associate with cigarette smoking. Strike up conversation instead of a match for a cigarette. If you miss the sensation of having a cigarette in your hand, play with something elsea pencil, a paper clip, a marble. If you miss having something in your mouth, try toothpicks or a fake cigarette. Return in about 6 months (around 9/26/2021).

## 2021-03-30 ASSESSMENT — ENCOUNTER SYMPTOMS
SHORTNESS OF BREATH: 0
VOMITING: 0
SORE THROAT: 0
ABDOMINAL PAIN: 0
COUGH: 0
NAUSEA: 0
EYE PAIN: 0

## 2021-04-01 ENCOUNTER — OFFICE VISIT (OUTPATIENT)
Dept: ORTHOPEDIC SURGERY | Facility: CLINIC | Age: 56
End: 2021-04-01

## 2021-04-01 VITALS — RESPIRATION RATE: 16 BRPM | BODY MASS INDEX: 30.96 KG/M2 | HEIGHT: 61 IN | WEIGHT: 164 LBS | TEMPERATURE: 96.8 F

## 2021-04-01 DIAGNOSIS — M65.30 TRIGGER FINGER, ACQUIRED: Primary | ICD-10-CM

## 2021-04-01 PROCEDURE — 99213 OFFICE O/P EST LOW 20 MIN: CPT | Performed by: ORTHOPAEDIC SURGERY

## 2021-04-01 PROCEDURE — 20550 NJX 1 TENDON SHEATH/LIGAMENT: CPT | Performed by: ORTHOPAEDIC SURGERY

## 2021-04-01 RX ORDER — LIDOCAINE HYDROCHLORIDE 10 MG/ML
5 INJECTION, SOLUTION INFILTRATION; PERINEURAL
Status: COMPLETED | OUTPATIENT
Start: 2021-04-01 | End: 2021-04-01

## 2021-04-01 RX ORDER — CELECOXIB 200 MG/1
200 CAPSULE ORAL
COMMUNITY
Start: 2021-03-26

## 2021-04-01 RX ORDER — FLUTICASONE PROPIONATE 50 MCG
2 SPRAY, SUSPENSION (ML) NASAL
COMMUNITY
Start: 2021-03-26

## 2021-04-01 RX ADMIN — LIDOCAINE HYDROCHLORIDE 5 MG: 10 INJECTION, SOLUTION INFILTRATION; PERINEURAL at 14:47

## 2021-04-01 NOTE — PROGRESS NOTES
Subjective   Patient ID: Adelia Tamayo is a 56 y.o. female  Pain of the Left Hand (Follow up trigger middle finger of left hand. Had injection 2020 that was effective. Requesting injection today. )             History of Present Illness  She returns with recurrent pain and triggering of the left long finger she got very satisfactory relief after her injection in 2020, no new injuries or falls loss of feeling in the left hand or injuries to the wrist.  She is bothered with a separate unrelated condition of her left knee she sustained a couple injuries and she would like to have that evaluated at another visit.      Review of Systems   Constitutional: Negative for diaphoresis, fever and unexpected weight change.   HENT: Negative for dental problem and sore throat.    Eyes: Negative for visual disturbance.   Respiratory: Negative for shortness of breath.    Cardiovascular: Negative for chest pain.   Gastrointestinal: Negative for abdominal pain, constipation, diarrhea, nausea and vomiting.   Genitourinary: Negative for difficulty urinating and frequency.   Musculoskeletal: Positive for arthralgias (Left hand).   Neurological: Negative for headaches.   Hematological: Does not bruise/bleed easily.       Past Medical History:   Diagnosis Date   • Body piercing     Both ears, nose   • Cataract    • Fibromyalgia    • History of being tatooed     x's 1        Past Surgical History:   Procedure Laterality Date   • CARPAL TUNNEL RELEASE Left    •  SECTION      x's 3   • CHOLECYSTECTOMY     • COLONOSCOPY N/A 2020    Procedure: COLONOSCOPY WITH HOT FORCEP POLYPECTOMY;  Surgeon: Moises Bonilla MD;  Location: [facility] ENDOSCOPY;  Service: Gastroenterology   • D & C AND LAPAROSCOPY     • KNEE SURGERY Right        History reviewed. No pertinent family history.    Social History     Socioeconomic History   • Marital status:      Spouse name: Not on file   • Number of children: Not on file   • Years  "of education: Not on file   • Highest education level: Not on file   Tobacco Use   • Smoking status: Current Every Day Smoker     Packs/day: 0.25   • Smokeless tobacco: Never Used   Vaping Use   • Vaping Use: Never used   Substance and Sexual Activity   • Alcohol use: No   • Drug use: Yes     Types: Marijuana   • Sexual activity: Defer       I have reviewed the medical and surgical history, family history, social history, medications, and/or allergies, and the review of systems of this report.    Allergies   Allergen Reactions   • Calcium Carbonate-Charcoal Unknown - High Severity     Rattle snake bite at 19 mo  Other reaction(s): Unknown - High Severity  Rattle snake bite at 19 mo         Current Outpatient Medications:   •  celecoxib (CeleBREX) 200 MG capsule, Take 200 mg by mouth., Disp: , Rfl:   •  Cholecalciferol (VITAMIN D3) 58132 units capsule, Take 50,000 Units by mouth Daily., Disp: , Rfl:   •  Diclofenac Sodium (VOLTAREN) 1 % gel gel, Apply 4 g topically to the appropriate area as directed., Disp: , Rfl:   •  estradiol (CLIMARA) 0.05 MG/24HR patch, Place 1 patch on the skin as directed by provider 1 (One) Time Per Week., Disp: , Rfl:   •  fluticasone (FLONASE) 50 MCG/ACT nasal spray, 2 sprays., Disp: , Rfl:   •  hydroCHLOROthiazide (MICROZIDE) 12.5 MG capsule, Take 12.5 mg by mouth Daily., Disp: , Rfl:   •  pantoprazole (PROTONIX) 40 MG EC tablet, Take 40 mg by mouth Daily., Disp: , Rfl:   •  progesterone (PROMETRIUM) 100 MG capsule, Take 100 mg by mouth Daily., Disp: , Rfl:     Objective   Temp 96.8 °F (36 °C)   Resp 16   Ht 154.9 cm (61\")   Wt 74.4 kg (164 lb)   BMI 30.99 kg/m²    Physical Exam  Constitutional: Patient is oriented to person, place, and time. Patient appears well-developed and well-nourished.   HENT:Head: Normocephalic and atraumatic.   Eyes: EOM are normal. Pupils are equal, round, and reactive to light.   Neck: Normal range of motion. Neck supple.   Cardiovascular: Normal rate.  "   Pulmonary/Chest: Effort normal and breath sounds normal.   Abdominal: Soft.   Neurological: Patient is alert and oriented to person, place, and time.   Skin: Skin is warm and dry.   Psychiatric: Patient has a normal mood and affect.   Nursing note and vitals reviewed.       [unfilled]   Left hand: There is positive tenderness at the A1 pulley of the long finger left hand full passive extension there is triggering with flexion intact sensorimotor function throughout the long and ring fingertips with negative Tinel's and Phalen sign at the median nerve at the wrist.    Assessment/Plan   Review of Radiographic Studies:    Radiographic images today of affected area I personally viewed and showed no sign of acute fracture or dislocation.      Left long trigger finger    Date/Time: 4/1/2021 2:47 PM  Performed by: Miah Javier MD  Authorized by: Miah Javier MD   Preparation: Patient was prepped and draped in the usual sterile fashion.  Local anesthesia used: yes    Anesthesia:  Local anesthesia used: yes  Local Anesthetic: lidocaine 1% without epinephrine  Anesthetic total: 0.5 mL    Sedation:  Patient sedated: no    Comments: 0.5 mL Triamcinolone-40 NDC 7449-2807-13 Lot# [id number] Exp 01/31/2023   Medications administered: 5 mg lidocaine 1 %           Diagnoses and all orders for this visit:    1. Trigger finger, acquired (Primary)    Other orders  -     Injection Tendon or Ligament       Orthopedic activities reviewed and patient expressed appreciation, Risk, benefits, and merits of treatment alternatives reviewed with the patient and questions answered and Using illustrations and models, the nature of the pathology was explained to the patient      Recommendations/Plan:   Work/Activity Status: May perform usual activities as tolerated    Patient agreeable to call or return sooner for any concerns.             Impression:  Recurrent left nondominant hand long finger trigger finger  Plan:  If triggering continues  or pain continues trigger finger release is recommended

## 2021-04-09 ENCOUNTER — HOSPITAL ENCOUNTER (OUTPATIENT)
Dept: MAMMOGRAPHY | Facility: HOSPITAL | Age: 56
Discharge: HOME | End: 2021-04-09
Payer: MEDICAID

## 2021-04-09 DIAGNOSIS — Z12.31 ENCOUNTER FOR SCREENING MAMMOGRAM FOR BREAST CANCER: ICD-10-CM

## 2021-04-09 PROCEDURE — 77063 BREAST TOMOSYNTHESIS BI: CPT

## 2021-04-12 ENCOUNTER — TELEPHONE (OUTPATIENT)
Dept: PRIMARY CARE CLINIC | Age: 56
End: 2021-04-12

## 2021-04-12 NOTE — TELEPHONE ENCOUNTER
----- Message from EMIL Vivar sent at 4/12/2021  1:26 PM EDT -----  Please call patient and inform results of last test were normal

## 2021-05-04 DIAGNOSIS — R52 BODY ACHES: ICD-10-CM

## 2021-05-04 DIAGNOSIS — Z20.822 SUSPECTED COVID-19 VIRUS INFECTION: ICD-10-CM

## 2021-05-04 RX ORDER — TIZANIDINE 4 MG/1
4 TABLET ORAL EVERY 6 HOURS PRN
Qty: 60 TABLET | Refills: 0 | Status: SHIPPED | OUTPATIENT
Start: 2021-05-04 | End: 2021-07-04 | Stop reason: SDUPTHER

## 2021-06-18 DIAGNOSIS — R52 BODY ACHES: ICD-10-CM

## 2021-06-18 DIAGNOSIS — Z20.822 SUSPECTED COVID-19 VIRUS INFECTION: ICD-10-CM

## 2021-06-18 RX ORDER — TIZANIDINE 4 MG/1
TABLET ORAL
Qty: 30 TABLET | OUTPATIENT
Start: 2021-06-18

## 2021-08-10 ENCOUNTER — HOSPITAL ENCOUNTER (OUTPATIENT)
Facility: HOSPITAL | Age: 56
Discharge: HOME | End: 2021-08-10
Payer: MEDICAID

## 2021-08-10 LAB — SARS-COV-2, NAAT: NOT DETECTED

## 2021-08-10 PROCEDURE — 87635 SARS-COV-2 COVID-19 AMP PRB: CPT

## 2021-08-16 RX ORDER — HYDROCHLOROTHIAZIDE 12.5 MG/1
12.5 CAPSULE, GELATIN COATED ORAL DAILY
Qty: 30 CAPSULE | Refills: 5 | Status: SHIPPED | OUTPATIENT
Start: 2021-08-16 | End: 2021-12-07 | Stop reason: SDUPTHER

## 2021-10-14 ENCOUNTER — HOSPITAL ENCOUNTER (OUTPATIENT)
Facility: HOSPITAL | Age: 56
Discharge: HOME | End: 2021-10-14
Payer: MEDICAID

## 2021-10-14 LAB — SARS-COV-2, NAAT: NOT DETECTED

## 2021-10-14 PROCEDURE — 87635 SARS-COV-2 COVID-19 AMP PRB: CPT

## 2021-11-09 ENCOUNTER — OFFICE VISIT (OUTPATIENT)
Dept: PRIMARY CARE CLINIC | Age: 56
End: 2021-11-09
Payer: MEDICAID

## 2021-11-09 VITALS — TEMPERATURE: 98.5 F | HEART RATE: 74 BPM | RESPIRATION RATE: 16 BRPM | OXYGEN SATURATION: 98 %

## 2021-11-09 DIAGNOSIS — J06.9 UPPER RESPIRATORY TRACT INFECTION, UNSPECIFIED TYPE: ICD-10-CM

## 2021-11-09 DIAGNOSIS — Z20.822 SUSPECTED 2019 NOVEL CORONAVIRUS INFECTION: Primary | ICD-10-CM

## 2021-11-09 DIAGNOSIS — Z71.89 EDUCATED ABOUT 2019 NOVEL CORONAVIRUS INFECTION: ICD-10-CM

## 2021-11-09 PROCEDURE — G8417 CALC BMI ABV UP PARAM F/U: HCPCS | Performed by: NURSE PRACTITIONER

## 2021-11-09 PROCEDURE — G8484 FLU IMMUNIZE NO ADMIN: HCPCS | Performed by: NURSE PRACTITIONER

## 2021-11-09 PROCEDURE — 3017F COLORECTAL CA SCREEN DOC REV: CPT | Performed by: NURSE PRACTITIONER

## 2021-11-09 PROCEDURE — G8428 CUR MEDS NOT DOCUMENT: HCPCS | Performed by: NURSE PRACTITIONER

## 2021-11-09 PROCEDURE — 99213 OFFICE O/P EST LOW 20 MIN: CPT | Performed by: NURSE PRACTITIONER

## 2021-11-09 PROCEDURE — 4004F PT TOBACCO SCREEN RCVD TLK: CPT | Performed by: NURSE PRACTITIONER

## 2021-11-09 RX ORDER — CEFDINIR 300 MG/1
300 CAPSULE ORAL 2 TIMES DAILY
Qty: 20 CAPSULE | Refills: 0 | Status: SHIPPED | OUTPATIENT
Start: 2021-11-09 | End: 2021-11-19

## 2021-11-09 RX ORDER — PANTOPRAZOLE SODIUM 40 MG/1
40 TABLET, DELAYED RELEASE ORAL DAILY
Qty: 30 TABLET | Refills: 5 | Status: SHIPPED | OUTPATIENT
Start: 2021-11-09 | End: 2021-12-07 | Stop reason: SDUPTHER

## 2021-11-09 RX ORDER — PROGESTERONE 100 MG/1
100 CAPSULE ORAL DAILY
Qty: 30 CAPSULE | Refills: 1 | Status: SHIPPED | OUTPATIENT
Start: 2021-11-09

## 2021-11-09 ASSESSMENT — PATIENT HEALTH QUESTIONNAIRE - PHQ9: DEPRESSION UNABLE TO ASSESS: URGENT/EMERGENT SITUATION

## 2021-11-09 NOTE — PROGRESS NOTES
2021    Manuel Brooks (:  1965) is a 61 y.o. female, here requesting COVID-19 testing    History of Present Illness  Cough    Vitals:    21 1355   Pulse: 74   Resp: 16   Temp: 98.5 °F (36.9 °C)   SpO2: 98%       ASSESSMENT  Screening for COVID-19/ Viral disease    PLAN  POCT influenza testing Not Tested  COVID-19 sample collected and submitted  Patient given detailed CDC instructions contained within After Visit Summary       An  electronic signature was used to authenticate this note.     --Ronald Johnson MA on 2021 at 1:55 PM

## 2021-11-09 NOTE — PROGRESS NOTES
SUBJECTIVE:    Patient ID: Michael Best is a 61 y. o.female. Chief Complaint   Patient presents with    Chest Congestion    Concern For COVID-19         HPI:  Pt c/o chest congestion, cough, Fatigue several days. No body aches, abdominal pain, SOB, CP. OTC not helpful. Needs med refills. Covid test today. Patient's medications, allergies, past medical, surgical, social and family histories were reviewed and updated as appropriate in electronic medical record. No outpatient medications have been marked as taking for the 21 encounter (Office Visit) with EMIL Oliva CNP. Review of Systems   Constitutional: Positive for fatigue. HENT: Positive for congestion. Respiratory: Positive for cough. All other systems reviewed and are negative.       Past Medical History:   Diagnosis Date    Connective tissue disease ([address]     Fibromyalgia     Hyperlipidemia     Hypertension     Osteoarthritis     Osteomalacia     Poisonous snake bite 25 mos old    swamp rattler    RLS (restless legs syndrome)      Past Surgical History:   Procedure Laterality Date    CARPAL TUNNEL RELEASE Left      SECTION  [id number]    CHOLECYSTECTOMY      DILATION AND CURETTAGE OF UTERUS      KNEE SURGERY Right     OTHER SURGICAL HISTORY      DNC x2     Family History   Problem Relation Age of Onset    High Blood Pressure Mother     Arthritis Mother         RA   [place] Jaime COPD Mother     Emphysema Mother     Other Father         parkinson's disease    Dementia Father     High Blood Pressure Brother     Breast Cancer Maternal Aunt       Social History     Tobacco Use   Smoking Status Light Tobacco Smoker    Packs/day: 0.25    Years: 15.00    Pack years: 3.75   Smokeless Tobacco Never Used       OBJECTIVE:   Wt Readings from Last 3 Encounters:   21 165 lb (74.8 kg)   03/10/20 163 lb 3.2 oz (74 kg)   20 167 lb (75.8 kg)     BP Readings from Last 3 Encounters: 03/26/21 130/80   03/10/20 (!) 153/92   02/24/20 130/84       Pulse 74   Temp 98.5 °F (36.9 °C)   Resp 16   LMP 01/01/2016 (Approximate)   SpO2 98%      Physical Exam  Vitals and nursing note reviewed. Constitutional:       General: She is not in acute distress. Appearance: Normal appearance. HENT:      Head: Normocephalic. Right Ear: External ear normal.      Left Ear: External ear normal.      Nose: Congestion present. Mouth/Throat:      Mouth: Mucous membranes are moist.      Pharynx: Oropharynx is clear. Posterior oropharyngeal erythema present. Eyes:      Conjunctiva/sclera: Conjunctivae normal.      Pupils: Pupils are equal, round, and reactive to light. Cardiovascular:      Rate and Rhythm: Normal rate and regular rhythm. Pulmonary:      Effort: Pulmonary effort is normal.      Breath sounds: Normal breath sounds. Abdominal:      Tenderness: There is no guarding. Musculoskeletal:      Cervical back: Normal range of motion. Lymphadenopathy:      Cervical: No cervical adenopathy. Neurological:      Mental Status: She is alert and oriented to person, place, and time. Psychiatric:         Mood and Affect: Mood normal.         Thought Content: Thought content normal.         Lab Results   Component Value Date     03/26/2021    K 4.3 03/26/2021     03/26/2021    CO2 29 03/26/2021    GLUCOSE 93 03/26/2021    BUN 16 03/26/2021    CREATININE 0.9 03/26/2021    CALCIUM 9.3 03/26/2021    PROT 6.6 03/26/2021    LABALBU 4.4 03/26/2021    BILITOT <0.2 03/26/2021    ALT 7 03/26/2021    AST 13 03/26/2021       LDL Calculated (mg/dL)   Date Value   03/26/2021 100         Lab Results   Component Value Date    WBC 8.0 03/26/2021    NEUTROABS 4.7 03/26/2021    HGB 14.2 03/26/2021    HCT 43.0 03/26/2021    MCV 94.3 03/26/2021     03/26/2021       No results found for: TSH      ASSESSMENT/PLAN:     1. Suspected 2019 novel coronavirus infection  2.  Educated about 2019 novel coronavirus infection  covid neg. See below. - [place] [facility]&/OR CONVEY OF SPEC FOR TR OFFICE TO LAB    3. Upper respiratory tract infection, unspecified type  Take medications as prescribed. Continue home medications. Discussed symptom management. Return to clinic S&S worsen or no improvement noted. Patient verbalized understanding.          Orders Placed This Encounter   Medications    pantoprazole (PROTONIX) 40 MG tablet     Sig: Take 1 tablet by mouth daily     Dispense:  30 tablet     Refill:  5    progesterone (PROMETRIUM) 100 MG CAPS capsule     Sig: Take 1 capsule by mouth daily     Dispense:  30 capsule     Refill:  1    cefdinir (OMNICEF) 300 MG capsule     Sig: Take 1 capsule by mouth 2 times daily for 10 days     Dispense:  20 capsule     Refill:  0

## 2021-11-13 ENCOUNTER — HOSPITAL ENCOUNTER (OUTPATIENT)
Dept: NURSING | Facility: HOSPITAL | Age: 56
Setting detail: INFUSION SERIES
Discharge: HOME | End: 2021-11-15
Payer: MEDICAID

## 2021-11-13 ENCOUNTER — OFFICE VISIT (OUTPATIENT)
Dept: PRIMARY CARE CLINIC | Age: 56
End: 2021-11-13
Payer: MEDICAID

## 2021-11-13 VITALS
TEMPERATURE: 98.4 F | RESPIRATION RATE: 22 BRPM | HEART RATE: 59 BPM | SYSTOLIC BLOOD PRESSURE: 154 MMHG | OXYGEN SATURATION: 98 % | DIASTOLIC BLOOD PRESSURE: 77 MMHG

## 2021-11-13 VITALS
BODY MASS INDEX: 31.15 KG/M2 | OXYGEN SATURATION: 100 % | TEMPERATURE: 97.1 F | HEIGHT: 61 IN | RESPIRATION RATE: 16 BRPM | HEART RATE: 71 BPM | WEIGHT: 165 LBS

## 2021-11-13 DIAGNOSIS — I10 ESSENTIAL HYPERTENSION: ICD-10-CM

## 2021-11-13 DIAGNOSIS — U07.1 COVID-19 VIRUS INFECTION: Primary | ICD-10-CM

## 2021-11-13 DIAGNOSIS — U07.1 COVID-19 VIRUS INFECTION: ICD-10-CM

## 2021-11-13 DIAGNOSIS — U07.1 COVID-19: ICD-10-CM

## 2021-11-13 DIAGNOSIS — R06.02 SOB (SHORTNESS OF BREATH): Primary | ICD-10-CM

## 2021-11-13 PROCEDURE — 99213 OFFICE O/P EST LOW 20 MIN: CPT | Performed by: PHYSICIAN ASSISTANT

## 2021-11-13 PROCEDURE — 2580000003 HC RX 258: Performed by: PHYSICIAN ASSISTANT

## 2021-11-13 PROCEDURE — 6370000000 HC RX 637 (ALT 250 FOR IP): Performed by: PHYSICIAN ASSISTANT

## 2021-11-13 PROCEDURE — M0243 CASIRIVI AND IMDEVI INFUSION: HCPCS

## 2021-11-13 PROCEDURE — 6360000002 HC RX W HCPCS: Performed by: PHYSICIAN ASSISTANT

## 2021-11-13 PROCEDURE — 96374 THER/PROPH/DIAG INJ IV PUSH: CPT

## 2021-11-13 RX ORDER — EPINEPHRINE 1 MG/ML
0.3 INJECTION, SOLUTION, CONCENTRATE INTRAVENOUS PRN
OUTPATIENT
Start: 2021-11-13

## 2021-11-13 RX ORDER — DIPHENHYDRAMINE HYDROCHLORIDE 50 MG/ML
25 INJECTION INTRAMUSCULAR; INTRAVENOUS ONCE
Status: CANCELLED
Start: 2021-11-13 | End: 2021-11-13

## 2021-11-13 RX ORDER — DIPHENHYDRAMINE HYDROCHLORIDE 50 MG/ML
50 INJECTION INTRAMUSCULAR; INTRAVENOUS ONCE
OUTPATIENT
Start: 2021-11-13 | End: 2021-11-13

## 2021-11-13 RX ORDER — SODIUM CHLORIDE 9 MG/ML
25 INJECTION, SOLUTION INTRAVENOUS PRN
OUTPATIENT
Start: 2021-11-13

## 2021-11-13 RX ORDER — SODIUM CHLORIDE 9 MG/ML
INJECTION, SOLUTION INTRAVENOUS CONTINUOUS
OUTPATIENT
Start: 2021-11-13

## 2021-11-13 RX ORDER — SODIUM CHLORIDE 0.9 % (FLUSH) 0.9 %
5-40 SYRINGE (ML) INJECTION PRN
Status: CANCELLED | OUTPATIENT
Start: 2021-11-13

## 2021-11-13 RX ORDER — ACETAMINOPHEN 325 MG/1
650 TABLET ORAL ONCE
Status: COMPLETED | OUTPATIENT
Start: 2021-11-13 | End: 2021-11-13

## 2021-11-13 RX ORDER — PREDNISONE 20 MG/1
20 TABLET ORAL 2 TIMES DAILY
Qty: 10 TABLET | Refills: 0 | Status: SHIPPED | OUTPATIENT
Start: 2021-11-13 | End: 2021-11-18

## 2021-11-13 RX ORDER — SODIUM CHLORIDE 9 MG/ML
INJECTION, SOLUTION INTRAVENOUS CONTINUOUS
Status: CANCELLED | OUTPATIENT
Start: 2021-11-13

## 2021-11-13 RX ORDER — ALBUTEROL SULFATE 90 UG/1
2 AEROSOL, METERED RESPIRATORY (INHALATION) EVERY 6 HOURS PRN
Qty: 18 G | Refills: 3 | Status: SHIPPED | OUTPATIENT
Start: 2021-11-13

## 2021-11-13 RX ORDER — ACETAMINOPHEN 325 MG/1
650 TABLET ORAL ONCE
Status: CANCELLED
Start: 2021-11-13 | End: 2021-11-13

## 2021-11-13 RX ORDER — METHYLPREDNISOLONE SODIUM SUCCINATE 125 MG/2ML
125 INJECTION, POWDER, LYOPHILIZED, FOR SOLUTION INTRAMUSCULAR; INTRAVENOUS ONCE
OUTPATIENT
Start: 2021-11-13 | End: 2021-11-13

## 2021-11-13 RX ORDER — HEPARIN SODIUM (PORCINE) LOCK FLUSH IV SOLN 100 UNIT/ML 100 UNIT/ML
500 SOLUTION INTRAVENOUS PRN
OUTPATIENT
Start: 2021-11-13

## 2021-11-13 RX ORDER — DIPHENHYDRAMINE HYDROCHLORIDE 50 MG/ML
25 INJECTION INTRAMUSCULAR; INTRAVENOUS ONCE
Status: COMPLETED | OUTPATIENT
Start: 2021-11-13 | End: 2021-11-13

## 2021-11-13 RX ORDER — SODIUM CHLORIDE 9 MG/ML
INJECTION, SOLUTION INTRAVENOUS CONTINUOUS
Status: ACTIVE | OUTPATIENT
Start: 2021-11-13 | End: 2021-11-14

## 2021-11-13 RX ORDER — AZITHROMYCIN 250 MG/1
250 TABLET, FILM COATED ORAL SEE ADMIN INSTRUCTIONS
Qty: 6 TABLET | Refills: 0 | Status: SHIPPED | OUTPATIENT
Start: 2021-11-13 | End: 2021-11-18

## 2021-11-13 RX ORDER — SODIUM CHLORIDE 0.9 % (FLUSH) 0.9 %
5-40 SYRINGE (ML) INJECTION PRN
Status: ACTIVE | OUTPATIENT
Start: 2021-11-13 | End: 2021-11-14

## 2021-11-13 RX ADMIN — CASIRIVIMAB AND IMDEVIMAB 1200 MG: 600; 600 INJECTION, SOLUTION, CONCENTRATE INTRAVENOUS at 16:04

## 2021-11-13 RX ADMIN — DIPHENHYDRAMINE HYDROCHLORIDE 25 MG: 50 INJECTION INTRAMUSCULAR; INTRAVENOUS at 16:06

## 2021-11-13 RX ADMIN — ACETAMINOPHEN 650 MG: 325 TABLET, FILM COATED ORAL at 16:06

## 2021-11-13 ASSESSMENT — ENCOUNTER SYMPTOMS
GASTROINTESTINAL NEGATIVE: 1
SHORTNESS OF BREATH: 1
COUGH: 1
RHINORRHEA: 0

## 2021-11-13 ASSESSMENT — PAIN SCALES - GENERAL: PAINLEVEL_OUTOF10: 0

## 2021-11-13 NOTE — PROGRESS NOTES
SUBJECTIVE:    Patient ID: Harman Ruiz is a 61 y. o.female. Chief Complaint   Patient presents with    Shortness of Breath    Positive For Covid-19       HPI:    Pt here with positive  covid test. She was tested 5 days ago and was +. Today she returns with worsening SOB. She states it comes in waves including while she is at rest. She is still having chest burning and coughing. She states her stuffy/runny nose are much improved. Patient's medications, allergies, past medical, surgical, social and family histories were reviewed and updated as appropriate in electronic medical record.       Current Outpatient Medications on File Prior to Visit   Medication Sig Dispense Refill    pantoprazole (PROTONIX) 40 MG tablet Take 1 tablet by mouth daily 30 tablet 5    progesterone (PROMETRIUM) 100 MG CAPS capsule Take 1 capsule by mouth daily 30 capsule 1    cefdinir (OMNICEF) 300 MG capsule Take 1 capsule by mouth 2 times daily for 10 days 20 capsule 0    fluticasone (FLONASE) 50 MCG/ACT nasal spray SHAKE LIQUID AND USE 2 SPRAYS IN EACH NOSTRIL DAILY 16 g 5    celecoxib (CELEBREX) 200 MG capsule TAKE 1 CAPSULE BY MOUTH TWICE DAILY 60 capsule 5    hydroCHLOROthiazide (MICROZIDE) 12.5 MG capsule TAKE 1 CAPSULE BY MOUTH DAILY 30 capsule 5    diclofenac sodium (VOLTAREN) 1 % GEL APPLY 4 GRAMS TOPICALLY 4 TIMES DAILY AS NEEDED FOR PAIN 100 g 5    tiZANidine (ZANAFLEX) 4 MG tablet Take 1 tablet by mouth every 6 hours as needed (body aches) 60 tablet 5    progesterone (PROMETRIUM) 100 MG capsule take 1 capsule by mouth once daily 30 capsule 5    estradiol (CLIMARA) 0.05 MG/24HR APPLY 1 PATCH EXTERNALLY TO THE SKIN 1 TIME A WEEK 4 patch 5    Melatonin 5 MG CHEW Take 1 tablet by mouth nightly Take by mouth 30 tablet 5    Cholecalciferol (VITAMIN D3) 1000 units TABS Take 1,000 Units by mouth nightly      Cyanocobalamin (B-12) 2500 MCG SUBL Place 2,500 mcg under the tongue daily  0    Potassium 99 MG TABS Take toxic-appearing or diaphoretic. HENT:      Head: Normocephalic and atraumatic. Mouth/Throat:      Mouth: Mucous membranes are moist.      Pharynx: Oropharynx is clear. Eyes:      Conjunctiva/sclera: Conjunctivae normal.      Pupils: Pupils are equal, round, and reactive to light. Cardiovascular:      Rate and Rhythm: Normal rate and regular rhythm. Heart sounds: Normal heart sounds. No murmur heard. No gallop. Pulmonary:      Effort: Pulmonary effort is normal.      Breath sounds: Normal breath sounds. No wheezing, rhonchi or rales. Skin:     General: Skin is warm and dry. Neurological:      Mental Status: She is alert and oriented to person, place, and time. Psychiatric:         Mood and Affect: Mood normal.         Behavior: Behavior normal.         Thought Content: Thought content normal.         Judgment: Judgment normal.         Lab Results   Component Value Date     03/26/2021    K 4.3 03/26/2021     03/26/2021    CO2 29 03/26/2021    GLUCOSE 93 03/26/2021    BUN 16 03/26/2021    CREATININE 0.9 03/26/2021    CALCIUM 9.3 03/26/2021    PROT 6.6 03/26/2021    LABALBU 4.4 03/26/2021    BILITOT <0.2 03/26/2021    ALT 7 03/26/2021    AST 13 03/26/2021     LDL Calculated (mg/dL)   Date Value   03/26/2021 100       Lab Results   Component Value Date    WBC 8.0 03/26/2021    NEUTROABS 4.7 03/26/2021    HGB 14.2 03/26/2021    HCT 43.0 03/26/2021    MCV 94.3 03/26/2021     03/26/2021     No results found for: TSH    ASSESSMENT/PLAN:     1. SOB (shortness of breath)  - predniSONE (DELTASONE) 20 MG tablet; Take 1 tablet by mouth 2 times daily for 5 days  Dispense: 10 tablet; Refill: 0  - albuterol sulfate  (90 Base) MCG/ACT inhaler; Inhale 2 puffs into the lungs every 6 hours as needed for Wheezing  Dispense: 18 g; Refill: 3  - azithromycin (ZITHROMAX) 250 MG tablet;  Take 1 tablet by mouth See Admin Instructions for 5 days 500mg on day 1 followed by 250mg on days 2 - 5 Dispense: 6 tablet; Refill: 0  -worse to ER    2. COVID-19  - predniSONE (DELTASONE) 20 MG tablet; Take 1 tablet by mouth 2 times daily for 5 days  Dispense: 10 tablet; Refill: 0  - albuterol sulfate  (90 Base) MCG/ACT inhaler; Inhale 2 puffs into the lungs every 6 hours as needed for Wheezing  Dispense: 18 g; Refill: 3  - azithromycin (ZITHROMAX) 250 MG tablet; Take 1 tablet by mouth See Admin Instructions for 5 days 500mg on day 1 followed by 250mg on days 2 - 5  Dispense: 6 tablet; Refill: 0  - will message Beth Bustamante about infusion for pt     No orders of the defined types were placed in this encounter.        Electronically signed by DEE Cavanaugh on 11/13/2021 at 11:51 AM

## 2021-11-13 NOTE — PATIENT INSTRUCTIONS
· Keep a list of your medicines with you. List all of the prescription medicines, nonprescription medicines, supplements, natural remedies, and vitamins that you take. Tell your healthcare providers who treat you about all of the products you are taking. Your provider can provide you with a form to keep track of them. Just ask. · Follow the directions that come with your medicine, including information about food or alcohol. Make sure you know how and when to take your medicine. Do not take more or less than you are supposed to take. · Keep all medicines out of the reach of children. · Store medicines according to the directions on the label. · Monitor yourself. Learn to know how your body reacts to your new medicine and keep track of how it makes you feel before attempting (If your provider has allowed you to do so) to drive or go to work. · Seek emergency medical attention if you think you have used too much of this medicine. An overdose of any prescription medicine can be fatal. Overdose symptoms may include extreme drowsiness, muscle weakness, confusion, cold and clammy skin, pinpoint pupils, shallow breathing, slow heart rate, fainting, or coma. · Don't share prescription medicines with others, even when they seem to have the same symptoms. What may be good for you may be harmful to others. · If you are no longer taking a prescribed medication and you have pills left please take your pills out of their original containers. Mix crushed pills with an undesirable substance, such as cat litter or used coffee grounds. Put the mixture into a disposable container with a lid, such as an empty margarine tub, or into a sealable bag. Cover up or remove any of your personal information on the empty containers by covering it with black permanent marker or duct tape. Place the sealed container with the mixture, and the empty drug containers, in the trash.    · If you use a medication that is in the form of a patch, dispose of used patches by folding them in half so that the sticky sides meet, and then flushing them down a toilet. They should not be placed in the household trash where children or pets can find them. · If you have any questions, ask your provider or pharmacist for more information. · Be sure to keep all appointments for provider visits or tests. We are committed to providing you with the best care possible. In order to help us achieve these goals please remember to bring all medications, herbal products, and over the counter supplements with you to each visit. If your provider has ordered testing for you, please be sure to follow up with our office if you have not received results within 7 days after the testing took place. *If you receive a survey after visiting one of our offices, please take time to share your experience concerning your physician office visit. These surveys are confidential and no health information about you is shared. We are eager to improve for you and we are counting on your feedback to help make that happen. ips to Help You Stop Smoking       Cigarette smoking is a preventable cause of death in the [place]. If you have thought about quitting but haven't been able to, here are some reasons why you should and some ways to do it. Here's Why   Quitting smoking now can decrease your risk of getting smoking-related illnesses like:   Heart disease   Stroke   Several types of cancer, including:   Lung   Mouth   Esophagus   Larynx   Bladder   Pancreas   Kidney   Chronic lung diseases:   Bronchitis   Emphysema   Asthma   Cataracts   Macular degeneration   Thyroid conditions   Hearing loss   Erectile dysfunction   Dementia   Osteoporosis   Here's How   Once you've decided to quit smoking, set your target quit date a few weeks away.  In the time leading up to your quit day, try some of these ideas offered by the [address] [place] [address] [place] to help you successfully quit smoking. For the best results, work with your doctor. Together, you can test your lung function and compare the results to those of a nonsmoking person. The results can be given to you as your lung age. Finding out your lung age right after having the test done may help you to stop smoking. Your doctor can also discuss with you all of your options and refer you to smoking-cessation support groups. You may wish to use nicotine replacement (gum, patches, inhaler) or one of the prescription medications that have been shown to increase quit rates and prolong abstinence from smoking. But whatever you and your doctor decide on these matters, it will still be you who decides when an how to quit. Here are some techniques:   Switch Brands   Switch to a brand you find distasteful. Change to a brand that is low in tar and nicotine a couple of weeks before your target quit date. This will help change your smoking behavior. However, do not smoke more cigarettes, inhale them more often or more deeply, or place your fingertips over the holes in the filters. All of these actions will increase your nicotine intake, and the idea is to get your body used to functioning without nicotine. Cut Down the Number of Cigarettes You Smoke   Smoke only half of each cigarette. Each day, postpone the lighting of your first cigarette by one hour. Decide you'll only smoke during odd or even hours of the day. Decide beforehand how many cigarettes you'll smoke during the day. For each additional cigarette, give a dollar to your favorite tejas. Change your eating habits to help you cut down. For example, drink milk, which many people consider incompatible with smoking. End meals or snacks with something that won't lead to a cigarette. Reach for a glass of juice instead of a cigarette for a \"pick-me-up. \"   Remember: Cutting down can help you quit, but it's not a substitute for quitting.  If you're down to about seven cigarettes a day, it's time to set your target quit date, and get ready to stick to it. Don't Smoke \"Automatically\"   Smoke only those cigarettes you really want. Catch yourself before you light up a cigarette out of pure habit. Don't empty your ashtrays. This will remind you of how many cigarettes you've smoked each day, and the sight and the smell of stale cigarettes butts will be very unpleasant. Make yourself aware of each cigarette by using the opposite hand or putting cigarettes in an unfamiliar location or a different pocket to break the automatic reach. If you light up many times during the day without even thinking about it, try to look in a mirror each time you put a match to your cigarette. You may decide you don't need it. Make Smoking Inconvenient   Stop buying cigarettes by the carton. Wait until one pack is empty before you buy another. Stop carrying cigarettes with you at home or at work. Make them difficult to get to. Make Smoking Unpleasant   Smoke only under circumstances that aren't especially pleasurable for you. If you like to smoke with others, smoke alone. Turn your chair to an empty corner and focus only on the cigarette you are smoking and all its many negative effects. Collect all your cigarette butts in one large glass container as a visual reminder of the filth made by smoking. Just Before Quitting   Practice going without cigarettes. Don't think of never smoking again. Think of quitting in terms of one day at a time . Tell yourself you won't smoke today, and then don't. Clean your clothes to rid them of the cigarette smell, which can linger a long time. On the Day You Quit   Throw away all your cigarettes and matches. Hide your lighters and ashtrays. Visit the dentist and have your teeth cleaned to get rid of tobacco stains. Notice how nice they look and resolve to keep them that way.    Make a list of things you'd like to buy for yourself or someone else. Estimate the cost in terms of packs of cigarettes, and put the money aside to buy these presents. Keep very busy on the big day. Go to the movies, exercise, take long walks, or go bike riding. Remind your family and friends that this is your quit date, and ask them to help you over the rough spots of the first couple of days and weeks. Buy yourself a treat or do something special to celebrate. Telephone and Internet Support   Telephone, web-, and computer-based programs can offer you the support that you need to quit and to stay smoke-free. You can find many programs online, like the [employer]'s [place] from Smoking . Immediately After Quitting   Develop a clean, fresh, nonsmoking environment around yourselfat work and at home. Buy yourself flowersyou may be surprised how much you can enjoy their scent now. The first few days after you quit, spend as much free time as possible in places where smoking isn't allowed, such as [address] [place] [address], museums, theaters, department stores, and churches. Drink large quantities of water and fruit juice (but avoid sodas that contain caffeine). Try to avoid alcohol, coffee, and other beverages that you associate with cigarette smoking. Strike up conversation instead of a match for a cigarette. If you miss the sensation of having a cigarette in your hand, play with something elsea pencil, a paper clip, a marble. If you miss having something in your mouth, try toothpicks or a fake cigarette.

## 2021-11-29 ASSESSMENT — ENCOUNTER SYMPTOMS: COUGH: 1

## 2021-12-07 ENCOUNTER — TELEPHONE (OUTPATIENT)
Dept: PRIMARY CARE CLINIC | Age: 56
End: 2021-12-07

## 2021-12-07 ENCOUNTER — OFFICE VISIT (OUTPATIENT)
Dept: PRIMARY CARE CLINIC | Age: 56
End: 2021-12-07
Payer: MEDICAID

## 2021-12-07 VITALS
OXYGEN SATURATION: 99 % | HEIGHT: 61 IN | BODY MASS INDEX: 33.04 KG/M2 | SYSTOLIC BLOOD PRESSURE: 138 MMHG | TEMPERATURE: 97.6 F | HEART RATE: 76 BPM | WEIGHT: 175 LBS | DIASTOLIC BLOOD PRESSURE: 98 MMHG

## 2021-12-07 DIAGNOSIS — G47.00 INSOMNIA, UNSPECIFIED TYPE: ICD-10-CM

## 2021-12-07 DIAGNOSIS — M79.7 FIBROMYALGIA: ICD-10-CM

## 2021-12-07 DIAGNOSIS — E78.2 MIXED HYPERLIPIDEMIA: ICD-10-CM

## 2021-12-07 DIAGNOSIS — K21.9 GASTROESOPHAGEAL REFLUX DISEASE WITHOUT ESOPHAGITIS: ICD-10-CM

## 2021-12-07 DIAGNOSIS — Z78.0 POSTMENOPAUSAL: ICD-10-CM

## 2021-12-07 DIAGNOSIS — M15.9 PRIMARY OSTEOARTHRITIS INVOLVING MULTIPLE JOINTS: ICD-10-CM

## 2021-12-07 DIAGNOSIS — I10 ESSENTIAL HYPERTENSION: Primary | ICD-10-CM

## 2021-12-07 DIAGNOSIS — E55.9 VITAMIN D DEFICIENCY: ICD-10-CM

## 2021-12-07 PROCEDURE — 3017F COLORECTAL CA SCREEN DOC REV: CPT | Performed by: PHYSICIAN ASSISTANT

## 2021-12-07 PROCEDURE — 99214 OFFICE O/P EST MOD 30 MIN: CPT | Performed by: PHYSICIAN ASSISTANT

## 2021-12-07 PROCEDURE — 4004F PT TOBACCO SCREEN RCVD TLK: CPT | Performed by: PHYSICIAN ASSISTANT

## 2021-12-07 PROCEDURE — G8484 FLU IMMUNIZE NO ADMIN: HCPCS | Performed by: PHYSICIAN ASSISTANT

## 2021-12-07 PROCEDURE — G8427 DOCREV CUR MEDS BY ELIG CLIN: HCPCS | Performed by: PHYSICIAN ASSISTANT

## 2021-12-07 PROCEDURE — G8417 CALC BMI ABV UP PARAM F/U: HCPCS | Performed by: PHYSICIAN ASSISTANT

## 2021-12-07 RX ORDER — CELECOXIB 200 MG/1
200 CAPSULE ORAL 2 TIMES DAILY
Qty: 60 CAPSULE | Refills: 5 | Status: SHIPPED | OUTPATIENT
Start: 2021-12-07 | End: 2022-08-03

## 2021-12-07 RX ORDER — PANTOPRAZOLE SODIUM 40 MG/1
40 TABLET, DELAYED RELEASE ORAL DAILY
Qty: 30 TABLET | Refills: 5 | Status: SHIPPED | OUTPATIENT
Start: 2021-12-07

## 2021-12-07 RX ORDER — TRAZODONE HYDROCHLORIDE 50 MG/1
TABLET ORAL
COMMUNITY
Start: 2021-11-01 | End: 2021-12-07 | Stop reason: SDUPTHER

## 2021-12-07 RX ORDER — HYDROCHLOROTHIAZIDE 12.5 MG/1
12.5 CAPSULE, GELATIN COATED ORAL DAILY
Qty: 30 CAPSULE | Refills: 5 | Status: SHIPPED | OUTPATIENT
Start: 2021-12-07 | End: 2022-06-02

## 2021-12-07 RX ORDER — CYCLOBENZAPRINE HCL 10 MG
10 TABLET ORAL NIGHTLY PRN
Qty: 10 TABLET | Refills: 0 | Status: SHIPPED | OUTPATIENT
Start: 2021-12-07 | End: 2021-12-17

## 2021-12-07 RX ORDER — TRAZODONE HYDROCHLORIDE 50 MG/1
50 TABLET ORAL NIGHTLY
Qty: 30 TABLET | Refills: 5 | Status: SHIPPED | OUTPATIENT
Start: 2021-12-07

## 2021-12-07 ASSESSMENT — ENCOUNTER SYMPTOMS
SORE THROAT: 0
SHORTNESS OF BREATH: 0
CONSTIPATION: 0
EYE PAIN: 0
ABDOMINAL PAIN: 0
DIARRHEA: 0
WHEEZING: 1
COUGH: 0

## 2021-12-07 ASSESSMENT — PATIENT HEALTH QUESTIONNAIRE - PHQ9
2. FEELING DOWN, DEPRESSED OR HOPELESS: 0
SUM OF ALL RESPONSES TO PHQ QUESTIONS 1-9: 0
SUM OF ALL RESPONSES TO PHQ QUESTIONS 1-9: 0
SUM OF ALL RESPONSES TO PHQ9 QUESTIONS 1 & 2: 0
1. LITTLE INTEREST OR PLEASURE IN DOING THINGS: 0
SUM OF ALL RESPONSES TO PHQ QUESTIONS 1-9: 0

## 2021-12-07 NOTE — PROGRESS NOTES
Alirio Blair (:  1965) is a 61 y.o. female,Established patient, here for evaluation of the following chief complaint(s):  Medication Refill, Insomnia, and Arm Pain           Subjective   SUBJECTIVE/OBJECTIVE:  HPI   Ms. Garima Hidalgo is a pleasant 61year old female who presents to reestablish care. Her PMH includes OA, GERD, HTN, HLD, Vit D Deficiency RLS, Fibromyalgia and Insomnia. She is need of refills of her routine meds. She complains of feeling tense and stiff at night and this causes difficultly sleeping since the age of [age]. She did have Covid 3 weeks ago and has residual wheezing she is using an inhaler for. She denies any SOA, productive cough, fever or chills. She is followed by GYN Dr. Shailesh Mccray and reports she is up to date on pap and mammogram.  She is unaware if she has ever had DEXA. She is up to date on colonoscopy. She was on a antihypertensive in past she had to stop due to GI side effects; she remains on HCTZ 12.5 mg. Past Medical History:   Diagnosis Date    Connective tissue disease ([facility] [address]     Fibromyalgia     Hyperlipidemia     Hypertension     Osteoarthritis     Osteomalacia     Poisonous snake bite 25 mos old    swamp rattler    RLS (restless legs syndrome)       Past Surgical History:   Procedure Laterality Date    CARPAL TUNNEL RELEASE Left      SECTION  [id number]    CHOLECYSTECTOMY      DILATION AND CURETTAGE OF UTERUS      KNEE SURGERY Right     OTHER SURGICAL HISTORY      DNC x2        Review of Systems   Constitutional: Negative for chills, fatigue and fever. HENT: Negative for congestion, ear pain, nosebleeds and sore throat. Eyes: Negative for pain and visual disturbance. Respiratory: Positive for wheezing. Negative for cough and shortness of breath. Cardiovascular: Negative for chest pain and palpitations. Gastrointestinal: Negative for abdominal pain, constipation and diarrhea.    Genitourinary: Negative for difficulty urinating and flank pain. Musculoskeletal: Negative for arthralgias, gait problem and myalgias. Skin: Negative for rash. Neurological: Negative for syncope, light-headedness and headaches. Insomnia   Psychiatric/Behavioral: Negative for behavioral problems, confusion and dysphoric mood. Vitals:    12/07/21 1350   BP: (!) 138/98   Pulse: 76   Temp: 97.6 °F (36.4 °C)   SpO2: 99%   Weight: 175 lb (79.4 kg)   Height: 5' 1\" (1.549 m)     Physical Exam  Vitals reviewed. Constitutional:       Appearance: Normal appearance. HENT:      Head: Normocephalic and atraumatic. Right Ear: Tympanic membrane, ear canal and external ear normal.      Left Ear: Tympanic membrane, ear canal and external ear normal.      Nose: Nose normal.      Mouth/Throat:      Mouth: Mucous membranes are moist.      Pharynx: Oropharynx is clear. Eyes:      Extraocular Movements: Extraocular movements intact. Conjunctiva/sclera: Conjunctivae normal.      Pupils: Pupils are equal, round, and reactive to light. Neck:      Vascular: No carotid bruit. Cardiovascular:      Rate and Rhythm: Normal rate and regular rhythm. Pulses: Normal pulses. Heart sounds: Normal heart sounds. Pulmonary:      Effort: Pulmonary effort is normal.      Breath sounds: Normal breath sounds. Abdominal:      General: Bowel sounds are normal.      Palpations: Abdomen is soft. Musculoskeletal:         General: Normal range of motion. Cervical back: Normal range of motion. Skin:     General: Skin is warm. Findings: No rash. Neurological:      General: No focal deficit present. Mental Status: She is alert and oriented to person, place, and time. Psychiatric:         Mood and Affect: Mood normal.         Thought Content: Thought content normal.                 ASSESSMENT/PLAN:  1. Essential hypertension  Elevated today.   Have advised patient low-sodium diet and exercise,as well as strict blood pressure monitoring at home.  - CBC WITH DIFFERENTIAL/PLATELET; Future  - Comprehensive Metabolic Panel; Future  - Lipid Panel; Future    2. Primary osteoarthritis involving multiple joints  Stable. Has been followed by Dr. Ca Benton rheumatology in the past.  - celecoxib (CELEBREX) 200 MG capsule; Take 1 capsule by mouth 2 times daily  Dispense: 60 capsule; Refill: 5  - diclofenac sodium (VOLTAREN) 1 % GEL; Apply 4 g topically 4 times daily as needed for Pain  Dispense: 100 g; Refill: 5    3. Gastroesophageal reflux disease without esophagitis  Advised her to switch to a.m. dosing.  - pantoprazole (PROTONIX) 40 MG tablet; Take 1 tablet by mouth daily  Dispense: 30 tablet; Refill: 5    4. Mixed hyperlipidemia  We will repeat lipid and evaluate for medication    5. Vitamin D deficiency  She is currently on over-the-counter dose 1000 IU daily  - Vitamin D 25 Hydroxy; Future    6. Postmenopausal  - DEXA BONE DENSITY 2 SITES; Future    7. Insomnia, unspecified type  Not at goal.  She is currently taking trazodone 12.5 mg daily if she exceeds this dose she feels too groggy the next day  - traZODone (DESYREL) 50 MG tablet; Take 1 tablet by mouth nightly  Dispense: 30 tablet; Refill: 5    8. Fibromyalgia  I have advised her to discontinue the tenacity and does the Flexeril nightly  - cyclobenzaprine (FLEXERIL) 10 MG tablet; Take 1 tablet by mouth nightly as needed for Muscle spasms  Dispense: 10 tablet; Refill: 0            Return in about 3 months (around 3/7/2022) for Recheck. An electronic signature was used to authenticate this note.     --Maryann Tijerina PA-C

## 2021-12-07 NOTE — TELEPHONE ENCOUNTER
----- Message from Mian Hamilton sent at 12/7/2021  9:42 AM EST -----  Subject: Appointment Request    Reason for Call: Routine Existing Condition Follow Up    QUESTIONS  Type of Appointment? New Patient/New to Provider  Reason for appointment request? Requested Provider unavailable - Felix Louie   Additional Information for Provider? Pt would like to establish care, pt   needs to med refill. New sleeping pill. Follow up appt. If [facility] is not   available would like Enoc Mueller. Please contact as soon as possible. Would prefer afternoon after 2 expect on thursdays.  ---------------------------------------------------------------------------  --------------  CALL BACK INFO  What is the best way for the office to contact you? OK to leave message on   voicemail  Preferred Call Back Phone Number? [phone]  ---------------------------------------------------------------------------  --------------  SCRIPT ANSWERS  Relationship to Patient? Self  Is this follow up request related to routine Diabetes Management? No  Have you been diagnosed with, awaiting test results for, or told that you   are suspected of having COVID-19 (Coronavirus)? (If patient has tested   negative or was tested as a requirement for work, school, or travel and   not based on symptoms, answer no)? Yes  Did your symptoms begin within the past 14 days or was your positive test   result within the past 14 days? No  Within the past two weeks have you developed any of the following symptoms   (answer no if symptoms have been present longer than 2 weeks or began   more than 2 weeks ago)? Fever or Chills, Cough, Shortness of breath or   difficulty breathing, Loss of taste or smell, Sore throat, Nasal   congestion, Sneezing or runny nose, Fatigue or generalized body aches   (answer no if pain is specific to a body part e.g. back pain), Diarrhea,   Headache? No  Have you had close contact with someone with COVID-19 in the last 14 days? No  (Service Expert  click yes below to proceed with [employer] As Usual   Scheduling)?  Yes

## 2021-12-07 NOTE — PROGRESS NOTES
Chief Complaint   Patient presents with    Medication Refill    Insomnia    Arm Pain       Have you seen any other physician or provider since your last visit yes - Patient had Covid since her last visit. Have you had any other diagnostic tests since your last visit? yes - covid test    Have you changed or stopped any medications since your last visit?  yes -

## 2021-12-07 NOTE — TELEPHONE ENCOUNTER
She has cancelled 2 new to providers with [facility] I guess she doesn't want to see him per this note she wants mervin or aaron. ..

## 2021-12-13 ENCOUNTER — HOSPITAL ENCOUNTER (OUTPATIENT)
Facility: HOSPITAL | Age: 56
Discharge: HOME | End: 2021-12-13
Payer: MEDICAID

## 2021-12-13 ENCOUNTER — HOSPITAL ENCOUNTER (OUTPATIENT)
Dept: GENERAL RADIOLOGY | Facility: HOSPITAL | Age: 56
Discharge: HOME | End: 2021-12-13
Payer: MEDICAID

## 2021-12-13 DIAGNOSIS — Z78.0 POSTMENOPAUSAL: ICD-10-CM

## 2021-12-13 DIAGNOSIS — E55.9 VITAMIN D DEFICIENCY: ICD-10-CM

## 2021-12-13 DIAGNOSIS — I10 ESSENTIAL HYPERTENSION: ICD-10-CM

## 2021-12-13 LAB
A/G RATIO: 2 (ref 0.8–2)
ALBUMIN SERPL-MCNC: 4.5 G/DL (ref 3.4–4.8)
ALP BLD-CCNC: 77 U/L (ref 25–100)
ALT SERPL-CCNC: 9 U/L (ref 4–36)
ANION GAP SERPL CALCULATED.3IONS-SCNC: 11 MMOL/L (ref 3–16)
AST SERPL-CCNC: 14 U/L (ref 8–33)
BASOPHILS ABSOLUTE: 0.1 K/UL (ref 0–0.1)
BASOPHILS RELATIVE PERCENT: 1.2 %
BILIRUB SERPL-MCNC: 0.5 MG/DL (ref 0.3–1.2)
BUN BLDV-MCNC: 14 MG/DL (ref 6–20)
CALCIUM SERPL-MCNC: 9.7 MG/DL (ref 8.5–10.5)
CHLORIDE BLD-SCNC: 101 MMOL/L (ref 98–107)
CHOLESTEROL, TOTAL: 229 MG/DL (ref 0–200)
CO2: 27 MMOL/L (ref 20–30)
CREAT SERPL-MCNC: 0.8 MG/DL (ref 0.4–1.2)
EOSINOPHILS ABSOLUTE: 0.2 K/UL (ref 0–0.4)
EOSINOPHILS RELATIVE PERCENT: 2.6 %
GFR AFRICAN AMERICAN: >59
GFR NON-AFRICAN AMERICAN: >60
GLOBULIN: 2.2 G/DL
GLUCOSE BLD-MCNC: 92 MG/DL (ref 74–106)
HCT VFR BLD CALC: 42.6 % (ref 37–47)
HDLC SERPL-MCNC: 66 MG/DL (ref 40–60)
HEMOGLOBIN: 14 G/DL (ref 11.5–16.5)
IMMATURE GRANULOCYTES #: 0 K/UL
IMMATURE GRANULOCYTES %: 0.3 % (ref 0–5)
LDL CHOLESTEROL CALCULATED: 141 MG/DL
LYMPHOCYTES ABSOLUTE: 1.8 K/UL (ref 1.5–4)
LYMPHOCYTES RELATIVE PERCENT: 27.7 %
MCH RBC QN AUTO: 30.4 PG (ref 27–32)
MCHC RBC AUTO-ENTMCNC: 32.9 G/DL (ref 31–35)
MCV RBC AUTO: 92.6 FL (ref 80–100)
MONOCYTES ABSOLUTE: 0.5 K/UL (ref 0.2–0.8)
MONOCYTES RELATIVE PERCENT: 8.1 %
NEUTROPHILS ABSOLUTE: 3.9 K/UL (ref 2–7.5)
NEUTROPHILS RELATIVE PERCENT: 60.1 %
PDW BLD-RTO: 12.4 % (ref 11–16)
PLATELET # BLD: 212 K/UL (ref 150–400)
PMV BLD AUTO: 10.1 FL (ref 6–10)
POTASSIUM SERPL-SCNC: 4 MMOL/L (ref 3.4–5.1)
RBC # BLD: 4.6 M/UL (ref 3.8–5.8)
SODIUM BLD-SCNC: 139 MMOL/L (ref 136–145)
TOTAL PROTEIN: 6.7 G/DL (ref 6.4–8.3)
TRIGL SERPL-MCNC: 109 MG/DL (ref 0–249)
VITAMIN D 25-HYDROXY: 36 (ref 32–100)
VLDLC SERPL CALC-MCNC: 22 MG/DL
WBC # BLD: 6.5 K/UL (ref 4–11)

## 2021-12-13 PROCEDURE — 77080 DXA BONE DENSITY AXIAL: CPT

## 2021-12-13 PROCEDURE — 85025 COMPLETE CBC W/AUTO DIFF WBC: CPT

## 2021-12-13 PROCEDURE — 80061 LIPID PANEL: CPT

## 2021-12-13 PROCEDURE — 80053 COMPREHEN METABOLIC PANEL: CPT

## 2021-12-13 PROCEDURE — 82306 VITAMIN D 25 HYDROXY: CPT

## 2022-01-10 ENCOUNTER — OFFICE VISIT (OUTPATIENT)
Dept: ORTHOPEDIC SURGERY | Facility: CLINIC | Age: 57
End: 2022-01-10

## 2022-01-10 VITALS — HEIGHT: 61 IN | WEIGHT: 177 LBS | BODY MASS INDEX: 33.42 KG/M2 | TEMPERATURE: 97.6 F

## 2022-01-10 DIAGNOSIS — M22.42 CHONDROMALACIA OF LEFT PATELLOFEMORAL JOINT: ICD-10-CM

## 2022-01-10 DIAGNOSIS — M17.12 PRIMARY OSTEOARTHRITIS OF LEFT KNEE: Primary | ICD-10-CM

## 2022-01-10 DIAGNOSIS — M25.562 ARTHRALGIA OF LEFT KNEE: ICD-10-CM

## 2022-01-10 PROCEDURE — 20610 DRAIN/INJ JOINT/BURSA W/O US: CPT | Performed by: PHYSICIAN ASSISTANT

## 2022-01-10 PROCEDURE — 99213 OFFICE O/P EST LOW 20 MIN: CPT | Performed by: PHYSICIAN ASSISTANT

## 2022-01-10 RX ORDER — METHYLPREDNISOLONE ACETATE 40 MG/ML
40 INJECTION, SUSPENSION INTRA-ARTICULAR; INTRALESIONAL; INTRAMUSCULAR; SOFT TISSUE
Status: COMPLETED | OUTPATIENT
Start: 2022-01-10 | End: 2022-01-10

## 2022-01-10 RX ORDER — TRAZODONE HYDROCHLORIDE 50 MG/1
0.5 TABLET ORAL NIGHTLY
COMMUNITY
Start: 2021-12-07

## 2022-01-10 RX ORDER — VIT C/B6/B5/MAGNESIUM/HERB 173 50-5-6-5MG
500 CAPSULE ORAL DAILY
COMMUNITY

## 2022-01-10 RX ORDER — ALBUTEROL SULFATE 90 UG/1
2 AEROSOL, METERED RESPIRATORY (INHALATION) AS NEEDED
COMMUNITY
Start: 2021-11-13

## 2022-01-10 RX ORDER — LIDOCAINE HYDROCHLORIDE 10 MG/ML
2 INJECTION, SOLUTION INFILTRATION; PERINEURAL
Status: COMPLETED | OUTPATIENT
Start: 2022-01-10 | End: 2022-01-10

## 2022-01-10 RX ORDER — CHOLECALCIFEROL (VITAMIN D3) 25 MCG
500 TABLET,CHEWABLE ORAL DAILY
COMMUNITY

## 2022-01-10 RX ORDER — TIZANIDINE 4 MG/1
2 TABLET ORAL NIGHTLY
COMMUNITY
Start: 2021-12-04

## 2022-01-10 RX ADMIN — METHYLPREDNISOLONE ACETATE 40 MG: 40 INJECTION, SUSPENSION INTRA-ARTICULAR; INTRALESIONAL; INTRAMUSCULAR; SOFT TISSUE at 14:09

## 2022-01-10 RX ADMIN — LIDOCAINE HYDROCHLORIDE 2 ML: 10 INJECTION, SOLUTION INFILTRATION; PERINEURAL at 14:09

## 2022-01-10 NOTE — PROGRESS NOTES
Subjective   Patient ID: Adelia Tamayo is a 56 y.o. right hand dominant female  Pain of the Left Knee (States the last two weeks she has been having a popping in her knee, she can't be on it for long and can't exercise like she normally does.)         History of Present Illness  Patient presents with left knee pain and stiffness that has been ongoing for the last 5 years (she had a fall and landed on the left knee) since she has developed knee pain.       Past Medical History:   Diagnosis Date   • Body piercing     Both ears, nose   • Cataract    • Fibromyalgia    • History of being tatooed     x's 1        Past Surgical History:   Procedure Laterality Date   • CARPAL TUNNEL RELEASE Left    •  SECTION      x's 3   • CHOLECYSTECTOMY     • COLONOSCOPY N/A 2020    Procedure: COLONOSCOPY WITH HOT FORCEP POLYPECTOMY;  Surgeon: Moises Bonilla MD;  Location: [facility] ENDOSCOPY;  Service: Gastroenterology   • D & C AND LAPAROSCOPY     • KNEE SURGERY Right        History reviewed. No pertinent family history.    Social History     Socioeconomic History   • Marital status:    Tobacco Use   • Smoking status: Current Every Day Smoker     Packs/day: 0.25   • Smokeless tobacco: Never Used   Vaping Use   • Vaping Use: Never used   Substance and Sexual Activity   • Alcohol use: No   • Drug use: Yes     Types: Marijuana   • Sexual activity: Defer         Current Outpatient Medications:   •  albuterol sulfate  (90 Base) MCG/ACT inhaler, Inhale 2 puffs As Needed., Disp: , Rfl:   •  cyanocobalamin (VITAMIN B-12) 2500 MCG tablet tablet, Take 500 mcg by mouth Daily., Disp: , Rfl:   •  traZODone (DESYREL) 50 MG tablet, Take 0.5 tablets by mouth Every Night., Disp: , Rfl:   •  Turmeric 500 MG capsule, Take 500 mg by mouth Daily., Disp: , Rfl:   •  celecoxib (CeleBREX) 200 MG capsule, Take 200 mg by mouth., Disp: , Rfl:   •  Cholecalciferol (VITAMIN D3) 77247 units capsule, Take 50,000 Units by mouth  "Daily., Disp: , Rfl:   •  Diclofenac Sodium (VOLTAREN) 1 % gel gel, Apply 4 g topically to the appropriate area as directed., Disp: , Rfl:   •  estradiol (CLIMARA) 0.05 MG/24HR patch, Place 1 patch on the skin as directed by provider 1 (One) Time Per Week., Disp: , Rfl:   •  fluticasone (FLONASE) 50 MCG/ACT nasal spray, 2 sprays., Disp: , Rfl:   •  hydroCHLOROthiazide (MICROZIDE) 12.5 MG capsule, Take 12.5 mg by mouth Daily., Disp: , Rfl:   •  pantoprazole (PROTONIX) 40 MG EC tablet, Take 40 mg by mouth Daily., Disp: , Rfl:   •  progesterone (PROMETRIUM) 100 MG capsule, Take 100 mg by mouth Daily., Disp: , Rfl:   •  tiZANidine (ZANAFLEX) 4 MG tablet, Take 2 mg by mouth Every Night., Disp: , Rfl:     Allergies   Allergen Reactions   • Calcium Carbonate-Charcoal Unknown - High Severity     Rattle snake bite at 19 mo  Other reaction(s): Unknown - High Severity  Rattle snake bite at 19 mo       Review of Systems   Constitutional: Negative for fever.   HENT: Negative for dental problem and voice change.    Eyes: Negative for visual disturbance.   Respiratory: Negative for shortness of breath.    Cardiovascular: Negative for chest pain.   Gastrointestinal: Negative for abdominal pain.   Genitourinary: Negative for dysuria.   Musculoskeletal: Positive for arthralgias (left knee). Negative for gait problem and joint swelling.   Skin: Negative for rash.   Neurological: Negative for speech difficulty.   Hematological: Does not bruise/bleed easily.   Psychiatric/Behavioral: Negative for confusion.       I have reviewed the medical and surgical history, family history, social history, medications, and/or allergies, and the review of systems of this report.    Objective   Temp 97.6 °F (36.4 °C)   Ht 153.7 cm (60.5\")   Wt 80.3 kg (177 lb)   BMI 34.00 kg/m²    Physical Exam  Vitals and nursing note reviewed.   Constitutional:       Appearance: Normal appearance.   Pulmonary:      Effort: Pulmonary effort is normal. "   Musculoskeletal:      Left knee:      Instability Tests: Medial Nicole test negative and lateral Nicole test negative.   Neurological:      Mental Status: She is alert and oriented to person, place, and time.       Left Knee Exam     Range of Motion   Extension: 5   Flexion: 100     Tests   Nicole:  Medial - negative Lateral - negative  Varus: negative Valgus: negative  Lachman:  Anterior - negative    Posterior - negative  Patellar apprehension: trace    Other   Pulse: present  Swelling: none           Extremity DVT signs are negative on physical exam with negative Mari sign, no calf pain, no palpable cords and no skin tone change   Neurologic Exam     Mental Status   Oriented to person, place, and time.        Positive left knee patella crepitus.      Assessment/Plan   Independent Review of Radiographic Studies:    X-ray of the left knee 2 views weightbearing performed in the office for evaluation of knee pain.  No comparison films are available to review.  No acute fracture or dislocation.  There does appear to be tricompartmental degenerative changes with significant inferior and superior patella spurring.      Large Joint Arthrocentesis: L knee  Date/Time: 1/10/2022 2:09 PM  Consent given by: patient  Site marked: site marked  Timeout: Immediately prior to procedure a time out was called to verify the correct patient, procedure, equipment, support staff and site/side marked as required   Supporting Documentation  Indications: pain   Procedure Details  Location: knee - L knee  Preparation: Patient was prepped and draped in the usual sterile fashion  Needle size: 22 G  Approach: anterolateral  Medications administered: 2 mL lidocaine 1 %; 40 mg methylPREDNISolone acetate 40 MG/ML  Patient tolerance: patient tolerated the procedure well with no immediate complications             Diagnoses and all orders for this visit:    1. Primary osteoarthritis of left knee (Primary)  -     Ambulatory Referral to  Physical Therapy Evaluate and treat, Ortho; Strengthening (quad/vmo)  -     Large Joint Arthrocentesis: L knee    2. Arthralgia of left knee  -     XR Knee 1 or 2 View Left; Future  -     Ambulatory Referral to Physical Therapy Evaluate and treat, Ortho; Strengthening (quad/vmo)  -     Large Joint Arthrocentesis: L knee    3. Chondromalacia of left patellofemoral joint  -     Ambulatory Referral to Physical Therapy Evaluate and treat, Ortho; Strengthening (quad/vmo)       Orthopedic activities reviewed and patient expressed appreciation  Discussion of orthopedic goals  Risk, benefits, and merits of treatment alternatives reviewed with the patient and questions answered  Physical therapy referral given  Call or notify for any adverse effect from injection therapy  Ice, heat, and/or modalities as beneficial    Recommendations/Plan:  Exercise, medications, injections, other patient advice, and return appointment as noted.  Patient is encouraged to call or return for any issues or concerns.    Follow up in 3 months     Patient agreeable to call or return sooner for any concerns.             EMR Dragon-transcription disclaimer:  This encounter note is an electronic transcription of spoken language to printed text.  Electronic transcription of spoken language may permit erroneous or at times nonsensical words or phrases to be inadvertently transcribed.  Although I have reviewed the note for such errors, some may still exist

## 2022-01-10 NOTE — PATIENT INSTRUCTIONS
Patellofemoral Pain Syndrome    Patellofemoral pain syndrome is a condition in which the tissue (cartilage) on the underside of the kneecap (patella) softens or breaks down. This causes pain in the front of the knee. The condition is also called runner's knee or chondromalacia patella. Patellofemoral pain syndrome is most common in young adults who are active in sports.  The knee is the largest joint in the body. The patella covers the front of the knee and is attached to muscles above and below the knee. The underside of the patella is covered with a smooth type of cartilage (synovium). The smooth surface helps the patella glide easily when you move your knee. Patellofemoral pain syndrome causes swelling in the joint linings and bone surfaces in the knee.  What are the causes?  This condition may be caused by:  · Overuse of the knee.  · Poor alignment of your knee joints.  · Weak leg muscles.  · A direct hit to your kneecap.  What increases the risk?  You are more likely to develop this condition if:  · You do a lot of activities that can wear down your kneecap. These include:  ? Running.  ? Squatting.  ? Climbing stairs.  · You start a new physical activity or exercise program.  · You wear shoes that do not fit well.  · You do not have good leg strength.  · You are overweight.  What are the signs or symptoms?  The main symptom of this condition is knee pain. This may feel like a dull, aching pain underneath your patella, in the front of your knee. There may be a popping or cracking sound when you move your knee. Pain may get worse with:  · Exercise.  · Climbing stairs.  · Running.  · Jumping.  · Squatting.  · Kneeling.  · Sitting for a long time.  · Moving or pushing on your patella.  How is this diagnosed?  This condition may be diagnosed based on:  · Your symptoms and medical history. You may be asked about your recent physical activities and which ones cause knee pain.  · A physical exam. This may  include:  ? Moving your patella back and forth.  ? Checking your range of knee motion.  ? Having you squat or jump to see if you have pain.  ? Checking the strength of your leg muscles.  · Imaging tests to confirm the diagnosis. These may include an MRI of your knee.  How is this treated?  This condition may be treated at home with rest, ice, compression, and elevation (RICE).   Other treatments may include:  · NSAIDs, such as ibuprofen.  · Physical therapy to stretch and strengthen your leg muscles.  · Shoe inserts (orthotics) to take stress off your knee.  · A knee brace or knee support.  · Adhesive tapes to the skin.  · Surgery to remove damaged cartilage or move the patella to a better position. This is rare.  Follow these instructions at home:  If you have a brace:  · Wear the brace as told by your health care provider. Remove it only as told by your health care provider.  · Loosen the brace if your toes tingle, become numb, or turn cold and blue.  · Keep the brace clean.  · If the brace is not waterproof:  ? Do not let it get wet.  ? Cover it with a watertight covering when you take a bath or a shower.  Managing pain, stiffness, and swelling    · If directed, put ice on the painful area. To do this:  ? If you have a removable brace, remove it as told by your health care provider.  ? Put ice in a plastic bag.  ? Place a towel between your skin and the bag.  ? Leave the ice on for 20 minutes, 2-3 times a day.  ? Remove the ice if your skin turns bright red. This is very important. If you cannot feel pain, heat, or cold, you have a greater risk of damage to the area.  · Move your toes often to reduce stiffness and swelling.  · Raise (elevate) the injured area above the level of your heart while you are sitting or lying down.    Activity  · Rest your knee.  · Avoid activities that cause knee pain.  · Perform stretching and strengthening exercises as told by your health care provider or physical  therapist.  · Return to your normal activities as told by your health care provider. Ask your health care provider what activities are safe for you.  General instructions  · Take over-the-counter and prescription medicines only as told by your health care provider.  · Use splints, braces, knee supports, or walking aids as directed by your health care provider.  · Do not use any products that contain nicotine or tobacco, such as cigarettes, e-cigarettes, and chewing tobacco. These can delay healing. If you need help quitting, ask your health care provider.  · Keep all follow-up visits. This is important.  Contact a health care provider if:  · Your symptoms get worse.  · You are not improving with home care.  Summary  · Patellofemoral pain syndrome is a condition in which the tissue (cartilage) on the underside of the kneecap (patella) softens or breaks down.  · This condition causes swelling in the joint linings and bone surfaces in the knee. This leads to pain in the front of the knee.  · This condition may be treated at home with rest, ice, compression, and elevation (RICE).  · Use splints, braces, knee supports, or walking aids as directed by your health care provider.  This information is not intended to replace advice given to you by your health care provider. Make sure you discuss any questions you have with your health care provider.  Document Revised: 06/02/2021 Document Reviewed: 06/02/2021  [employer] Patient Education © 2021 [employer]    Arthritis  Arthritis means joint pain. It can also mean joint disease. A joint is a place where bones come together. There are more than 100 types of arthritis.  What are the causes?  This condition may be caused by:  · Wear and tear of a joint. This is the most common cause.  · A lot of acid in the blood, which leads to pain in the joint (gout).  · Pain and swelling (inflammation) in a joint.  · Infection of a joint.  · Injuries in the joint.  · A reaction to medicines  (allergy).  In some cases, the cause may not be known.  What are the signs or symptoms?  Symptoms of this condition include:  · Redness at a joint.  · Swelling at a joint.  · Stiffness at a joint.  · Warmth coming from the joint.  · A fever.  · A feeling of being sick.  How is this treated?  This condition may be treated with:  · Treating the cause, if it is known.  · Rest.  · Raising (elevating) the joint.  · Putting cold or hot packs on the joint.  · Medicines to treat symptoms and reduce pain and swelling.  · Shots of medicines (cortisone) into the joint.  You may also be told to make changes in your life, such as doing exercises and losing weight.  Follow these instructions at home:  Medicines  · Take over-the-counter and prescription medicines only as told by your doctor.  · Do not take aspirin for pain if your doctor says that you may have gout.  Activity  · Rest your joint if your doctor tells you to.  · Avoid activities that make the pain worse.  · Exercise your joint regularly as told by your doctor. Try doing exercises like:  ? Swimming.  ? Water aerobics.  ? Biking.  ? Walking.  Managing pain, stiffness, and swelling         · If told, put ice on the affected area.  ? Put ice in a plastic bag.  ? Place a towel between your skin and the bag.  ? Leave the ice on for 20 minutes, 2-3 times per day.  · If your joint is swollen, raise (elevate) it above the level of your heart if told by your doctor.  · If your joint feels stiff in the morning, try taking a warm shower.  · If told, put heat on the affected area. Do this as often as told by your doctor. Use the heat source that your doctor recommends, such as a moist heat pack or a heating pad. If you have diabetes, do not apply heat without asking your doctor. To apply heat:  ? Place a towel between your skin and the heat source.  ? Leave the heat on for 20-30 minutes.  ? Remove the heat if your skin turns bright red. This is very important if you are unable to  feel pain, heat, or cold. You may have a greater risk of getting burned.  General instructions  · Do not use any products that contain nicotine or tobacco, such as cigarettes, e-cigarettes, and chewing tobacco. If you need help quitting, ask your doctor.  · Keep all follow-up visits as told by your doctor. This is important.  Contact a doctor if:  · The pain gets worse.  · You have a fever.  Get help right away if:  · You have very bad pain in your joint.  · You have swelling in your joint.  · Your joint is red.  · Many joints become painful and swollen.  · You have very bad back pain.  · Your leg is very weak.  · You cannot control your pee (urine) or poop (stool).  Summary  · Arthritis means joint pain. It can also mean joint disease. A joint is a place where bones come together.  · The most common cause of this condition is wear and tear of a joint.  · Symptoms of this condition include redness, swelling, or stiffness of the joint.  · This condition is treated with rest, raising the joint, medicines, and putting cold or hot packs on the joint.  · Follow your doctor's instructions about medicines, activity, exercises, and other home care treatments.  This information is not intended to replace advice given to you by your health care provider. Make sure you discuss any questions you have with your health care provider.  Document Revised: 11/25/2019 Document Reviewed: 11/25/2019  [employer] Patient Education © 2021 [employer]    Preventing Osteoarthritis, Adult  Osteoarthritis is a type of arthritis that affects tissue that covers the ends of bones in joints (cartilage). Cartilage acts as a cushion between the bones and helps them move smoothly. Osteoarthritis results when cartilage in the joints gets worn down. This causes the joint to break down over time.  Osteoarthritis cannot always be prevented, but you can take steps to lower your risk of developing this condition. Once you have osteoarthritis, it does not  go away, so lowering your risk is important.  How can this condition affect me?  Osteoarthritis can affect any joint and can make movement painful. It usually affects the joints of the hands, spine, hips, knees, or toes. The knee joints are most commonly affected. The condition can cause symptoms such as:  · Pain, tenderness, and stiffness in a joint, especially first thing in the morning or after a period of resting.  · Trouble moving the affected joint.  · A grating or scraping feeling inside the joint when using it.  This condition can make it harder to do things that you need or want to do each day. Osteoarthritis in a major joint, such as your knee or hip, can make it painful to walk or exercise. If you develop osteoarthritis in your hands, you might not be able to  items, twist your hand, or control small movements of your hands and fingers. Osteoarthritis is a degenerative disease. This means that it may get worse over time.  What can increase my risk?  Some factors may make you more likely to develop osteoarthritis, such as:  · Being 45 years of age or older.  · Being overweight.  · Doing activities that put extra stress on your joints.  · Injuring or overusing a joint.  · Having previous surgery on a joint.  · Having a family history of osteoarthritis.  Some risk factors, such as your age and family history, cannot be changed. But you can take steps to control other risk factors and lower your chances of developing osteoarthritis.  What actions can I take to prevent this condition?  Activity         · Stay active. Doing moderate exercise for 30 minutes, 5 times a week will help you maintain a healthy weight and strengthen the muscles that support your joints. Good exercises for preventing osteoarthritis and protecting your joints include low-impact activities, such as walking, swimming, hiking, yoga, and biking.  · Avoid doing too many high-impact exercises and activities that put extra stress on  joints. These include activities that involve heavy lifting, gripping, bending, kneeling, and squatting. Osteoarthritis is much more common in people with jobs that require these activities. If your job involves this type of activity, learn ways to reduce the stress on your joints, such as:  ? Using proper lifting techniques.  ? Taking breaks as needed.  · Consider working with a  or  when starting a new physical activity or sport. A  or  can help you:  ? Learn proper techniques for the activity.  ? Come up with a training plan that is safe and effective for you.  · Get treatment for any joint injury. After an injury, do not return to full activity until you have been cleared by your health care provider. An injured joint that does not heal properly is much more likely to develop osteoarthritis.  · Maintain good posture while standing and sitting. This reduces stress on the joints of your back, neck, and hips.  ? When standing, keep your upper back and neck straight, with your shoulders pulled back. Avoid slouching.  ? When sitting, keep your back straight and relax your shoulders. Do not round your shoulders or pull them backward.  Lifestyle  · Lose weight if you are overweight. More weight puts more stress on your joints.  · Control your blood sugar. Diabetes may increase your risk of osteoarthritis. Have your blood sugar checked to make sure you are not at risk of developing diabetes. If you already have diabetes, work closely with your health care provider to keep your blood sugar at a healthy level.  · Do not wear high heels. These put stress on your toes, knees, and hips.  · Do not use any products that contain nicotine or tobacco, such as cigarettes, e-cigarettes, and chewing tobacco. If you need help quitting, ask your health care provider.  Nutrition    · Eat a healthy diet that includes foods that are high in vitamin C, vitamin D, and omega-3 fatty acids.  ? Get vitamin C from  fruits and vegetables.  ? Get vitamin D from fish, eggs, and dairy products. Some foods have vitamin D added to them (are fortified). Look for fortified foods like milk, orange juice, and cereals. Spending some time in the sun also increases vitamin D.  ? Get omega-3 from cold-water fish, such as cod, mackerel, and sardines. Other sources include:  § Fortified eggs.  § Soybeans.  § Nuts, such as walnuts.  § Flax, jaylin, and hemp seeds.  · Check with your health care provider before taking an over-the-counter supplement for bone or joint health, especially if you are taking any medicines. Supplements may interact with some medicines. Also, evidence that taking a supplement will reduce your risk of osteoarthritis is not strong.    Where to find more information  · Arthritis Foundation: www.arthritis.org  · American College of Rheumatology: www.rheumatology.org  · National [place] of Arthritis and Musculoskeletal and Skin Diseases: www.niams.nih.gov  Contact a health care provider if:  · You have pain or swelling in a joint.  · You have joint stiffness or you lose full motion at the joint.  · Your joint becomes large and knobby.  · You have cracking or grinding in a joint.  · You have a joint injury.  Summary  · Osteoarthritis involves the loss of tissue that covers the ends of bones in joints (cartilage). It can affect any joint and can make movement painful.  · Osteoarthritis is most common in middle and older age.  · Osteoarthritis cannot always be prevented, but you can take steps to lower your risk of developing this condition.  · Eating a healthy diet, losing weight, and staying active may lower your risk of osteoarthritis.  · Let your health care provider know if you have a joint injury or joint pain.  This information is not intended to replace advice given to you by your health care provider. Make sure you discuss any questions you have with your health care provider.  Document Revised: 06/29/2020 Document  Reviewed: 03/24/2020  [employer] Patient Education © 2021 [employer]    Preventing Osteoarthritis, Adult  Osteoarthritis is a type of arthritis that affects tissue that covers the ends of bones in joints (cartilage). Cartilage acts as a cushion between the bones and helps them move smoothly. Osteoarthritis results when cartilage in the joints gets worn down. This causes the joint to break down over time.  Osteoarthritis cannot always be prevented, but you can take steps to lower your risk of developing this condition. Once you have osteoarthritis, it does not go away, so lowering your risk is important.  How can this condition affect me?  Osteoarthritis can affect any joint and can make movement painful. It usually affects the joints of the hands, spine, hips, knees, or toes. The knee joints are most commonly affected. The condition can cause symptoms such as:  · Pain, tenderness, and stiffness in a joint, especially first thing in the morning or after a period of resting.  · Trouble moving the affected joint.  · A grating or scraping feeling inside the joint when using it.  This condition can make it harder to do things that you need or want to do each day. Osteoarthritis in a major joint, such as your knee or hip, can make it painful to walk or exercise. If you develop osteoarthritis in your hands, you might not be able to  items, twist your hand, or control small movements of your hands and fingers. Osteoarthritis is a degenerative disease. This means that it may get worse over time.  What can increase my risk?  Some factors may make you more likely to develop osteoarthritis, such as:  · Being 45 years of age or older.  · Being overweight.  · Doing activities that put extra stress on your joints.  · Injuring or overusing a joint.  · Having previous surgery on a joint.  · Having a family history of osteoarthritis.  Some risk factors, such as your age and family history, cannot be changed. But you can take  steps to control other risk factors and lower your chances of developing osteoarthritis.  What actions can I take to prevent this condition?  Activity         · Stay active. Doing moderate exercise for 30 minutes, 5 times a week will help you maintain a healthy weight and strengthen the muscles that support your joints. Good exercises for preventing osteoarthritis and protecting your joints include low-impact activities, such as walking, swimming, hiking, yoga, and biking.  · Avoid doing too many high-impact exercises and activities that put extra stress on joints. These include activities that involve heavy lifting, gripping, bending, kneeling, and squatting. Osteoarthritis is much more common in people with jobs that require these activities. If your job involves this type of activity, learn ways to reduce the stress on your joints, such as:  ? Using proper lifting techniques.  ? Taking breaks as needed.  · Consider working with a  or  when starting a new physical activity or sport. A  or  can help you:  ? Learn proper techniques for the activity.  ? Come up with a training plan that is safe and effective for you.  · Get treatment for any joint injury. After an injury, do not return to full activity until you have been cleared by your health care provider. An injured joint that does not heal properly is much more likely to develop osteoarthritis.  · Maintain good posture while standing and sitting. This reduces stress on the joints of your back, neck, and hips.  ? When standing, keep your upper back and neck straight, with your shoulders pulled back. Avoid slouching.  ? When sitting, keep your back straight and relax your shoulders. Do not round your shoulders or pull them backward.  Lifestyle  · Lose weight if you are overweight. More weight puts more stress on your joints.  · Control your blood sugar. Diabetes may increase your risk of osteoarthritis. Have your blood sugar checked to make  sure you are not at risk of developing diabetes. If you already have diabetes, work closely with your health care provider to keep your blood sugar at a healthy level.  · Do not wear high heels. These put stress on your toes, knees, and hips.  · Do not use any products that contain nicotine or tobacco, such as cigarettes, e-cigarettes, and chewing tobacco. If you need help quitting, ask your health care provider.  Nutrition    · Eat a healthy diet that includes foods that are high in vitamin C, vitamin D, and omega-3 fatty acids.  ? Get vitamin C from fruits and vegetables.  ? Get vitamin D from fish, eggs, and dairy products. Some foods have vitamin D added to them (are fortified). Look for fortified foods like milk, orange juice, and cereals. Spending some time in the sun also increases vitamin D.  ? Get omega-3 from cold-water fish, such as cod, mackerel, and sardines. Other sources include:  § Fortified eggs.  § Soybeans.  § Nuts, such as walnuts.  § Flax, jaylin, and hemp seeds.  · Check with your health care provider before taking an over-the-counter supplement for bone or joint health, especially if you are taking any medicines. Supplements may interact with some medicines. Also, evidence that taking a supplement will reduce your risk of osteoarthritis is not strong.    Where to find more information  · Arthritis Foundation: www.arthritis.org  · American College of Rheumatology: www.rheumatology.org  · National [place] of Arthritis and Musculoskeletal and Skin Diseases: www.niams.nih.gov  Contact a health care provider if:  · You have pain or swelling in a joint.  · You have joint stiffness or you lose full motion at the joint.  · Your joint becomes large and knobby.  · You have cracking or grinding in a joint.  · You have a joint injury.  Summary  · Osteoarthritis involves the loss of tissue that covers the ends of bones in joints (cartilage). It can affect any joint and can make movement  painful.  · Osteoarthritis is most common in middle and older age.  · Osteoarthritis cannot always be prevented, but you can take steps to lower your risk of developing this condition.  · Eating a healthy diet, losing weight, and staying active may lower your risk of osteoarthritis.  · Let your health care provider know if you have a joint injury or joint pain.  This information is not intended to replace advice given to you by your health care provider. Make sure you discuss any questions you have with your health care provider.  Document Revised: 06/29/2020 Document Reviewed: 03/24/2020  [employer] Patient Education © 2021 [employer]

## 2022-01-18 ENCOUNTER — TELEPHONE (OUTPATIENT)
Dept: PRIMARY CARE CLINIC | Age: 57
End: 2022-01-18

## 2022-01-18 NOTE — TELEPHONE ENCOUNTER
Carmella German is reuqesting an rx of progesterone sent to [facility] in [place]. She said that her gynecologist doesn't fill it for her since she only goes once a year. She uses it with her estrodol patches.

## 2022-01-31 ENCOUNTER — HOSPITAL ENCOUNTER (OUTPATIENT)
Facility: HOSPITAL | Age: 57
Discharge: HOME | End: 2022-01-31
Payer: MEDICAID

## 2022-01-31 ENCOUNTER — HOSPITAL ENCOUNTER (OUTPATIENT)
Dept: GENERAL RADIOLOGY | Facility: HOSPITAL | Age: 57
Discharge: HOME | End: 2022-01-31
Payer: MEDICAID

## 2022-01-31 DIAGNOSIS — R06.02 SOB (SHORTNESS OF BREATH): ICD-10-CM

## 2022-01-31 DIAGNOSIS — Z86.16 PERSONAL HISTORY OF COVID-19: ICD-10-CM

## 2022-01-31 PROCEDURE — 71046 X-RAY EXAM CHEST 2 VIEWS: CPT

## 2022-02-22 ENCOUNTER — OFFICE VISIT (OUTPATIENT)
Dept: PRIMARY CARE CLINIC | Age: 57
End: 2022-02-22
Payer: MEDICAID

## 2022-02-22 VITALS
OXYGEN SATURATION: 100 % | BODY MASS INDEX: 33.04 KG/M2 | WEIGHT: 175 LBS | RESPIRATION RATE: 14 BRPM | HEART RATE: 71 BPM | TEMPERATURE: 97.8 F | HEIGHT: 61 IN

## 2022-02-22 DIAGNOSIS — Z20.822 SUSPECTED COVID-19 VIRUS INFECTION: Primary | ICD-10-CM

## 2022-02-22 DIAGNOSIS — Z71.89 EDUCATED ABOUT COVID-19 VIRUS INFECTION: ICD-10-CM

## 2022-02-22 LAB
Lab: NORMAL
QC PASS/FAIL: NORMAL
SARS-COV-2 RDRP RESP QL NAA+PROBE: NEGATIVE

## 2022-02-22 PROCEDURE — G8484 FLU IMMUNIZE NO ADMIN: HCPCS | Performed by: NURSE PRACTITIONER

## 2022-02-22 PROCEDURE — 87635 SARS-COV-2 COVID-19 AMP PRB: CPT | Performed by: NURSE PRACTITIONER

## 2022-02-22 PROCEDURE — 4004F PT TOBACCO SCREEN RCVD TLK: CPT | Performed by: NURSE PRACTITIONER

## 2022-02-22 PROCEDURE — G8417 CALC BMI ABV UP PARAM F/U: HCPCS | Performed by: NURSE PRACTITIONER

## 2022-02-22 PROCEDURE — 3017F COLORECTAL CA SCREEN DOC REV: CPT | Performed by: NURSE PRACTITIONER

## 2022-02-22 PROCEDURE — 99213 OFFICE O/P EST LOW 20 MIN: CPT | Performed by: NURSE PRACTITIONER

## 2022-02-22 PROCEDURE — G8428 CUR MEDS NOT DOCUMENT: HCPCS | Performed by: NURSE PRACTITIONER

## 2022-02-22 ASSESSMENT — PATIENT HEALTH QUESTIONNAIRE - PHQ9
SUM OF ALL RESPONSES TO PHQ QUESTIONS 1-9: 0
SUM OF ALL RESPONSES TO PHQ9 QUESTIONS 1 & 2: 0
SUM OF ALL RESPONSES TO PHQ QUESTIONS 1-9: 0
1. LITTLE INTEREST OR PLEASURE IN DOING THINGS: 0
2. FEELING DOWN, DEPRESSED OR HOPELESS: 0
SUM OF ALL RESPONSES TO PHQ QUESTIONS 1-9: 0
SUM OF ALL RESPONSES TO PHQ QUESTIONS 1-9: 0

## 2022-02-22 NOTE — PROGRESS NOTES
SUBJECTIVE:    Patient ID: Zay Cuadra is a 61 y. o.female. Chief Complaint   Patient presents with    Cough         HPI:    Pt c/o sneezing and congestion few days. Would like covid test because has new grandchild. No fevers, chills, body aches or known exposure. Hx covid in the past. No SOB, CP. Using nasal spray mild relief. Patient's medications, allergies, past medical, surgical, social and family histories were reviewed and updated as appropriate in electronic medical record. No outpatient medications have been marked as taking for the 22 encounter (Office Visit) with EMIL Brice CNP. Review of Systems   HENT: Positive for congestion and sneezing. All other systems reviewed and are negative.       Past Medical History:   Diagnosis Date    Connective tissue disease ([address]     Fibromyalgia     Hyperlipidemia     Hypertension     Osteoarthritis     Osteomalacia     Poisonous snake bite 25 mos old    swamp rattler    RLS (restless legs syndrome)      Past Surgical History:   Procedure Laterality Date    CARPAL TUNNEL RELEASE Left      SECTION  0506,3384,9630    CHOLECYSTECTOMY      DILATION AND CURETTAGE OF UTERUS      KNEE SURGERY Right     OTHER SURGICAL HISTORY      DNC x2     Family History   Problem Relation Age of Onset    High Blood Pressure Mother     Arthritis Mother         RA   Quintero COPD Mother     Emphysema Mother     Other Father         parkinson's disease    Dementia Father     High Blood Pressure Brother     Breast Cancer Maternal Aunt       Social History     Tobacco Use   Smoking Status Light Tobacco Smoker    Packs/day: 0.25    Years: 15.00    Pack years: 3.75   Smokeless Tobacco Never Used       OBJECTIVE:   Wt Readings from Last 3 Encounters:   22 175 lb (79.4 kg)   21 175 lb (79.4 kg)   21 165 lb (74.8 kg)     BP Readings from Last 3 Encounters:   21 (!) 138/98   21 (!) 154/77 03/26/21 130/80       Pulse 71   Temp 97.8 °F (36.6 °C) (Temporal)   Resp 14   Ht 5' 1\" (1.549 m)   Wt 175 lb (79.4 kg)   LMP 01/01/2016 (Approximate)   SpO2 100%   BMI 33.07 kg/m²      Physical Exam  Vitals and nursing note reviewed. Constitutional:       General: She is not in acute distress. Appearance: Normal appearance. HENT:      Head: Normocephalic. Nose: Congestion present. Eyes:      Conjunctiva/sclera: Conjunctivae normal.   Cardiovascular:      Rate and Rhythm: Normal rate. Pulmonary:      Effort: Pulmonary effort is normal.   Abdominal:      Tenderness: There is no guarding. Musculoskeletal:      Cervical back: Normal range of motion. Neurological:      Mental Status: She is alert and oriented to person, place, and time. Psychiatric:         Mood and Affect: Mood normal.         Thought Content: Thought content normal.         Lab Results   Component Value Date     12/13/2021    K 4.0 12/13/2021     12/13/2021    CO2 27 12/13/2021    GLUCOSE 92 12/13/2021    BUN 14 12/13/2021    CREATININE 0.8 12/13/2021    CALCIUM 9.7 12/13/2021    PROT 6.7 12/13/2021    LABALBU 4.5 12/13/2021    BILITOT 0.5 12/13/2021    ALT 9 12/13/2021    AST 14 12/13/2021       LDL Calculated (mg/dL)   Date Value   12/13/2021 141 (H)         Lab Results   Component Value Date    WBC 6.5 12/13/2021    NEUTROABS 3.9 12/13/2021    HGB 14.0 12/13/2021    HCT 42.6 12/13/2021    MCV 92.6 12/13/2021     12/13/2021       No results found for: TSH      ASSESSMENT/PLAN:     1. Suspected COVID-19 virus infection  2. Educated about COVID-19 virus infection  covid negative. Symptom management. Recommended Zyrtec daily.     - POCT COVID-19 Rapid, NAAT        No orders of the defined types were placed in this encounter.

## 2022-02-22 NOTE — PROGRESS NOTES
2022    [facility] (:  1965) is a 61 y.o. female, here requesting COVID-19 testing    History of Present Illness  Cough    Vitals:    22 1426   Pulse: 71   Resp: 14   Temp: 97.8 °F (36.6 °C)   TempSrc: Temporal   SpO2: 100%   Weight: 175 lb (79.4 kg)   Height: 5' 1\" (1.549 m)       ASSESSMENT  Screening for COVID-19/ Viral disease    PLAN  POCT influenza testing Not Tested  COVID-19 sample collected and submitted  Patient given detailed CDC instructions contained within After Visit Summary       An  electronic signature was used to authenticate this note. --Caeasr Blankenship MA on 2022 at 2:33 PM    2022    [facility] (:  1965) is a 61 y.o. female, here requesting COVID-19 testing    History of Present Illness  Cough    Vitals:    22 1426   Pulse: 71   Resp: 14   Temp: 97.8 °F (36.6 °C)   TempSrc: Temporal   SpO2: 100%   Weight: 175 lb (79.4 kg)   Height: 5' 1\" (1.549 m)       ASSESSMENT  Screening for COVID-19/ Viral disease    PLAN  POCT influenza testing Not Tested  COVID-19 sample collected and submitted  Patient given detailed CDC instructions contained within After Visit Summary       An  electronic signature was used to authenticate this note.     --Caesar Blankenship MA on 2022 at 2:32 PM

## 2022-04-22 ENCOUNTER — TELEPHONE (OUTPATIENT)
Dept: ORTHOPEDIC SURGERY | Facility: CLINIC | Age: 57
End: 2022-04-22

## 2022-04-22 NOTE — TELEPHONE ENCOUNTER
Caller: Adelia Tamayo June    Relationship to patient: Self    Best call back number: [phone]    Chief complaint: LEFT KNEE PAIN    Type of visit: INJECTIONS    Requested date: ASAP     Additional notes:MS. TAMAYO IS REQUESTING INJECTIONS IN HER LEFT KNEE. SHE SAID IT'S VERY PAINFUL, SHE'S LIMPING, AND HER KNEE IS MAKING POPPING NOISES. PLEASE CALL HER AT THE NUMBER PROVIDED. SHE WOULD LIKE TO GET IN TODAY IF YOU HAVE ANY CANCELLATIONS. THANKS!

## 2022-05-04 DIAGNOSIS — U07.1 COVID-19: ICD-10-CM

## 2022-05-04 DIAGNOSIS — R06.02 SOB (SHORTNESS OF BREATH): ICD-10-CM

## 2022-06-02 DIAGNOSIS — I10 ESSENTIAL HYPERTENSION: ICD-10-CM

## 2022-06-02 RX ORDER — HYDROCHLOROTHIAZIDE 12.5 MG/1
12.5 CAPSULE, GELATIN COATED ORAL DAILY
Qty: 30 CAPSULE | Refills: 5 | Status: SHIPPED | OUTPATIENT
Start: 2022-06-02

## 2022-08-01 DIAGNOSIS — M15.9 PRIMARY OSTEOARTHRITIS INVOLVING MULTIPLE JOINTS: ICD-10-CM

## 2022-08-03 RX ORDER — CELECOXIB 200 MG/1
200 CAPSULE ORAL 2 TIMES DAILY
Qty: 60 CAPSULE | Refills: 0 | Status: SHIPPED | OUTPATIENT
Start: 2022-08-03

## 2022-08-31 DIAGNOSIS — M15.9 PRIMARY OSTEOARTHRITIS INVOLVING MULTIPLE JOINTS: ICD-10-CM

## 2022-08-31 RX ORDER — CELECOXIB 200 MG/1
CAPSULE ORAL
Qty: 60 CAPSULE | Refills: 0 | OUTPATIENT
Start: 2022-08-31

## 2022-12-12 DIAGNOSIS — I10 ESSENTIAL HYPERTENSION: ICD-10-CM

## 2022-12-13 RX ORDER — HYDROCHLOROTHIAZIDE 12.5 MG/1
12.5 CAPSULE, GELATIN COATED ORAL DAILY
Qty: 30 CAPSULE | Refills: 5 | OUTPATIENT
Start: 2022-12-13

## 2023-03-10 ENCOUNTER — TELEPHONE (OUTPATIENT)
Dept: SURGERY | Facility: CLINIC | Age: 58
End: 2023-03-10
Payer: COMMERCIAL

## 2023-03-10 NOTE — TELEPHONE ENCOUNTER
Patient on 3 year recall for colonoscopy. Called and spoke with patient and she ready to schedule. Updated insurance and demographics. Please give her a calll

## 2023-03-15 RX ORDER — BISACODYL 5 MG
TABLET, DELAYED RELEASE (ENTERIC COATED) ORAL
Qty: 4 TABLET | Refills: 0 | Status: SHIPPED | OUTPATIENT
Start: 2023-03-15

## 2023-03-15 RX ORDER — POLYETHYLENE GLYCOL 3350 17 G/17G
238 POWDER, FOR SOLUTION ORAL ONCE
Qty: 238 G | Refills: 0 | Status: SHIPPED | OUTPATIENT
Start: 2023-03-15 | End: 2023-03-15

## 2023-03-15 NOTE — TELEPHONE ENCOUNTER
Pt scheduled for open access screening colonoscopy @ [facility] 03/24/2023.PRESCREENING FOR OPEN ACCESS SCHEDULING    Adelia Tamayo, 1965  [id number]    03/15/23    If, the patient answers yes to any of the following questions the provider will be informed prior to scheduling open access for approval and documented in the chart.    [x]  Yes  [] No    1. Have you ever had a colonoscopy in the past?      When:  Please see EMR    Where:       Polyps or other:     [x]  Yes  [] No    2. Family history of colon cancer?      Relation: Brother      Age of onset:       Do you currently have any of the following?    []  Yes  [] No  Rectal bleeding, if so, how long?     []  Yes  [] No  Abdominal pain, if so, how long?    []  Yes  [] No  Constipation, if so, how long?    []  Yes  [] No  Diarrhea, if so, how long?    []  Yes  [] No  Weight loss, is so, how much?    [] Yes  [] No  Small caliber stool, if so, how long?      Have you ever had any of the following conditions?    [] Yes  [] No  Heart attack?      When?       Last cardiac workup?     Blood thinners?    [] Yes  [] No   Lung problems, asthma or COPD?  [] Yes  [] No  Oxygen required?       [] Yes  [] No  Stroke?     [] Yes  [] No  Have you ever had a reaction to anesthesia?

## 2023-03-21 ENCOUNTER — TELEPHONE (OUTPATIENT)
Dept: SURGERY | Facility: CLINIC | Age: 58
End: 2023-03-21
Payer: COMMERCIAL

## 2023-03-22 ENCOUNTER — PREP FOR SURGERY (OUTPATIENT)
Dept: OTHER | Facility: HOSPITAL | Age: 58
End: 2023-03-22
Payer: COMMERCIAL

## 2023-03-22 DIAGNOSIS — Z12.11 SCREENING FOR COLON CANCER: Primary | ICD-10-CM

## 2023-03-24 ENCOUNTER — OUTSIDE FACILITY SERVICE (OUTPATIENT)
Dept: SURGERY | Facility: CLINIC | Age: 58
End: 2023-03-24
Payer: COMMERCIAL

## 2023-03-24 PROCEDURE — 45378 DIAGNOSTIC COLONOSCOPY: CPT | Performed by: SURGERY

## 2023-04-23 ENCOUNTER — APPOINTMENT (OUTPATIENT)
Dept: GENERAL RADIOLOGY | Facility: HOSPITAL | Age: 58
End: 2023-04-23
Payer: MEDICAID

## 2023-04-23 ENCOUNTER — HOSPITAL ENCOUNTER (EMERGENCY)
Facility: HOSPITAL | Age: 58
Discharge: HOME | End: 2023-04-23
Attending: EMERGENCY MEDICINE
Payer: MEDICAID

## 2023-04-23 VITALS
SYSTOLIC BLOOD PRESSURE: 172 MMHG | TEMPERATURE: 97.9 F | WEIGHT: 163 LBS | OXYGEN SATURATION: 99 % | HEART RATE: 62 BPM | DIASTOLIC BLOOD PRESSURE: 83 MMHG | HEIGHT: 61 IN | RESPIRATION RATE: 16 BRPM | BODY MASS INDEX: 30.78 KG/M2

## 2023-04-23 DIAGNOSIS — S46.811A STRAIN OF OTHER MUSCLES, FASCIA AND TENDONS AT SHOULDER AND UPPER ARM LEVEL, RIGHT ARM, INITIAL ENCOUNTER: Primary | ICD-10-CM

## 2023-04-23 PROCEDURE — 73030 X-RAY EXAM OF SHOULDER: CPT

## 2023-04-23 PROCEDURE — 99284 EMERGENCY DEPT VISIT MOD MDM: CPT

## 2023-04-23 PROCEDURE — 73060 X-RAY EXAM OF HUMERUS: CPT

## 2023-04-23 PROCEDURE — 96372 THER/PROPH/DIAG INJ SC/IM: CPT

## 2023-04-23 PROCEDURE — 6360000002 HC RX W HCPCS: Performed by: EMERGENCY MEDICINE

## 2023-04-23 RX ORDER — ESTROGEN,CON/M-PROGEST ACET 0.45-1.5MG
1 TABLET ORAL NIGHTLY
COMMUNITY
Start: 2023-03-31

## 2023-04-23 RX ORDER — TRAMADOL HYDROCHLORIDE 50 MG/1
50 TABLET ORAL EVERY 6 HOURS PRN
Qty: 12 TABLET | Refills: 0 | Status: SHIPPED | OUTPATIENT
Start: 2023-04-23 | End: 2023-04-26

## 2023-04-23 RX ORDER — KETOROLAC TROMETHAMINE 30 MG/ML
15 INJECTION, SOLUTION INTRAMUSCULAR; INTRAVENOUS ONCE
Status: COMPLETED | OUTPATIENT
Start: 2023-04-23 | End: 2023-04-23

## 2023-04-23 RX ORDER — TIZANIDINE 4 MG/1
2 TABLET ORAL NIGHTLY PRN
COMMUNITY
Start: 2023-04-17

## 2023-04-23 RX ADMIN — KETOROLAC TROMETHAMINE 15 MG: 30 INJECTION, SOLUTION INTRAMUSCULAR at 09:13

## 2023-04-23 ASSESSMENT — PAIN - FUNCTIONAL ASSESSMENT: PAIN_FUNCTIONAL_ASSESSMENT: 0-10

## 2023-04-23 ASSESSMENT — PAIN DESCRIPTION - LOCATION
LOCATION: ARM
LOCATION: ARM

## 2023-04-23 ASSESSMENT — PAIN SCALES - GENERAL
PAINLEVEL_OUTOF10: 4
PAINLEVEL_OUTOF10: 4

## 2023-04-23 ASSESSMENT — LIFESTYLE VARIABLES: HOW OFTEN DO YOU HAVE A DRINK CONTAINING ALCOHOL: NEVER

## 2023-04-23 ASSESSMENT — PAIN DESCRIPTION - ORIENTATION
ORIENTATION: RIGHT
ORIENTATION: RIGHT

## 2023-04-23 ASSESSMENT — PAIN DESCRIPTION - DESCRIPTORS
DESCRIPTORS: SORE
DESCRIPTORS: SORE

## 2023-04-23 ASSESSMENT — PAIN DESCRIPTION - PAIN TYPE: TYPE: ACUTE PAIN

## 2023-07-13 ENCOUNTER — HOSPITAL ENCOUNTER (OUTPATIENT)
Dept: MAMMOGRAPHY | Facility: HOSPITAL | Age: 58
Discharge: HOME | End: 2023-07-13
Payer: MEDICAID

## 2023-07-13 DIAGNOSIS — Z12.31 BREAST CANCER SCREENING BY MAMMOGRAM: ICD-10-CM

## 2023-07-13 PROCEDURE — 77063 BREAST TOMOSYNTHESIS BI: CPT

## 2023-09-04 ENCOUNTER — APPOINTMENT (OUTPATIENT)
Dept: GENERAL RADIOLOGY | Facility: HOSPITAL | Age: 58
End: 2023-09-04
Payer: MEDICAID

## 2023-09-04 ENCOUNTER — HOSPITAL ENCOUNTER (EMERGENCY)
Facility: HOSPITAL | Age: 58
Discharge: HOME | End: 2023-09-04
Attending: EMERGENCY MEDICINE
Payer: MEDICAID

## 2023-09-04 VITALS
HEART RATE: 67 BPM | DIASTOLIC BLOOD PRESSURE: 84 MMHG | OXYGEN SATURATION: 99 % | SYSTOLIC BLOOD PRESSURE: 150 MMHG | TEMPERATURE: 98.2 F | RESPIRATION RATE: 18 BRPM

## 2023-09-04 DIAGNOSIS — S46.111A STRAIN OF MUSCLE, FASCIA AND TENDON OF LONG HEAD OF BICEPS, RIGHT ARM, INITIAL ENCOUNTER: Primary | ICD-10-CM

## 2023-09-04 PROCEDURE — 73130 X-RAY EXAM OF HAND: CPT

## 2023-09-04 PROCEDURE — 73030 X-RAY EXAM OF SHOULDER: CPT

## 2023-09-04 PROCEDURE — 96372 THER/PROPH/DIAG INJ SC/IM: CPT

## 2023-09-04 PROCEDURE — 6360000002 HC RX W HCPCS: Performed by: EMERGENCY MEDICINE

## 2023-09-04 PROCEDURE — 99284 EMERGENCY DEPT VISIT MOD MDM: CPT

## 2023-09-04 PROCEDURE — 73060 X-RAY EXAM OF HUMERUS: CPT

## 2023-09-04 RX ORDER — KETOROLAC TROMETHAMINE 30 MG/ML
15 INJECTION, SOLUTION INTRAMUSCULAR; INTRAVENOUS ONCE
Status: COMPLETED | OUTPATIENT
Start: 2023-09-04 | End: 2023-09-04

## 2023-09-04 RX ORDER — KETOROLAC TROMETHAMINE 10 MG/1
10 TABLET, FILM COATED ORAL EVERY 6 HOURS PRN
Qty: 20 TABLET | Refills: 0 | Status: SHIPPED | OUTPATIENT
Start: 2023-09-04 | End: 2024-09-03

## 2023-09-04 RX ADMIN — KETOROLAC TROMETHAMINE 15 MG: 30 INJECTION, SOLUTION INTRAMUSCULAR; INTRAVENOUS at 12:25

## 2023-09-04 ASSESSMENT — PAIN DESCRIPTION - ORIENTATION
ORIENTATION: RIGHT
ORIENTATION: RIGHT

## 2023-09-04 ASSESSMENT — PAIN DESCRIPTION - LOCATION
LOCATION: ARM
LOCATION: ARM

## 2023-09-04 ASSESSMENT — LIFESTYLE VARIABLES
HOW OFTEN DO YOU HAVE A DRINK CONTAINING ALCOHOL: NEVER
HOW MANY STANDARD DRINKS CONTAINING ALCOHOL DO YOU HAVE ON A TYPICAL DAY: PATIENT DOES NOT DRINK

## 2023-09-04 ASSESSMENT — PAIN DESCRIPTION - DESCRIPTORS: DESCRIPTORS: THROBBING

## 2023-09-04 ASSESSMENT — PAIN SCALES - GENERAL: PAINLEVEL_OUTOF10: 6

## 2023-09-04 NOTE — ED PROVIDER NOTES
range    GENERAL APPEARANCE: Awake and alert. Cooperative. No acute distress. HEAD:  Atraumatic. EYES: EOM's grossly intact. EXTREMITIES: No acute deformities. Right arm 2+ brachial and radial pulses sensation is intact throughout she has full range of motion of her fingers and wrist she does have some tenderness over the thenar eminence region of her hand with slight swelling and ecchymosis she develops pain in the biceps region when she flexes her wrist.  She is only able to flex her elbow to about 90 degrees secondary to pain in the mid biceps region she is able to fully extend the elbow she is unable to elevate her shoulder secondary to pain in the upper arm. SKIN: Warm and dry. NEUROLOGICAL: Moves all 4 extremities spontaneously. PSYCHIATRIC: Normal mood. DIAGNOSTIC RESULTS   LABS:    Labs Reviewed - No data to display    When ordered only abnormal lab results are displayed. All other labs were within normal range or not returned as of this dictation. EKG:     RADIOLOGY:   Non-plain film images such as CT, Ultrasound and MRI are read by the radiologist. Plain radiographic images are visualized and preliminarily interpreted by the ED Provider with the below findings:        Interpretation per the Radiologist below, if available at the time of this note:    XR SHOULDER RIGHT (MIN 2 VIEWS)   Final Result   1. No findings for acute traumatic bony or soft tissue abnormality. 3 VIEWS OF THE RIGHT SHOULDER      HISTORY: Fall with pain      FINDINGS: There is no discrete fracture or dislocation. Humeral head remains   within the glenoid fossa. Hypertrophic AC joint arthropathy is noted. No   discrete soft tissue abnormality identified. IMPRESSION:   1. No findings for acute traumatic bony abnormality within the right shoulder. 2. Hypertrophic AC joint arthropathy. XR HUMERUS RIGHT (MIN 2 VIEWS)   Final Result   1.  No findings for acute traumatic bony or soft tissue

## 2023-09-04 NOTE — ED TRIAGE NOTES
Pt reports that she was walking and rolled her ankle off the curb, fell hit head on concrete, reports that she didn't loss consciousness, but that it felt like she was going to. Really dazed. Pt reports pain with movement of right arm, cant raise arm up. Right hand pain.

## 2023-10-19 ENCOUNTER — TRANSCRIBE ORDERS (OUTPATIENT)
Dept: GENERAL RADIOLOGY | Facility: HOSPITAL | Age: 58
End: 2023-10-19

## 2023-10-19 DIAGNOSIS — M25.511 RIGHT SHOULDER PAIN, UNSPECIFIED CHRONICITY: Primary | ICD-10-CM

## 2023-10-30 ENCOUNTER — HOSPITAL ENCOUNTER (OUTPATIENT)
Dept: MRI IMAGING | Facility: HOSPITAL | Age: 58
Discharge: HOME | End: 2023-10-30
Payer: MEDICAID

## 2023-10-30 DIAGNOSIS — M25.511 RIGHT SHOULDER PAIN, UNSPECIFIED CHRONICITY: ICD-10-CM

## 2023-10-30 PROCEDURE — 73221 MRI JOINT UPR EXTREM W/O DYE: CPT

## 2023-11-29 ENCOUNTER — TELEPHONE (OUTPATIENT)
Dept: PREADMISSION TESTING | Facility: HOSPITAL | Age: 58
End: 2023-11-29

## 2023-11-30 ENCOUNTER — PRE-ADMISSION TESTING (OUTPATIENT)
Dept: PREADMISSION TESTING | Facility: HOSPITAL | Age: 58
End: 2023-11-30
Payer: COMMERCIAL

## 2023-11-30 VITALS — BODY MASS INDEX: 32.71 KG/M2 | WEIGHT: 166.6 LBS | HEIGHT: 60 IN

## 2023-11-30 LAB
ANION GAP SERPL CALCULATED.3IONS-SCNC: 9.6 MMOL/L (ref 5–15)
BUN SERPL-MCNC: 18 MG/DL (ref 6–20)
BUN/CREAT SERPL: 21.2 (ref 7–25)
CALCIUM SPEC-SCNC: 9.5 MG/DL (ref 8.6–10.5)
CHLORIDE SERPL-SCNC: 100 MMOL/L (ref 98–107)
CO2 SERPL-SCNC: 29.4 MMOL/L (ref 22–29)
CREAT SERPL-MCNC: 0.85 MG/DL (ref 0.57–1)
DEPRECATED RDW RBC AUTO: 45 FL (ref 37–54)
EGFRCR SERPLBLD CKD-EPI 2021: 79 ML/MIN/1.73
ERYTHROCYTE [DISTWIDTH] IN BLOOD BY AUTOMATED COUNT: 13.1 % (ref 12.3–15.4)
GLUCOSE SERPL-MCNC: 90 MG/DL (ref 65–99)
HCT VFR BLD AUTO: 41.3 % (ref 34–46.6)
HGB BLD-MCNC: 13.8 G/DL (ref 12–15.9)
MCH RBC QN AUTO: 31.4 PG (ref 26.6–33)
MCHC RBC AUTO-ENTMCNC: 33.4 G/DL (ref 31.5–35.7)
MCV RBC AUTO: 94.1 FL (ref 79–97)
PLATELET # BLD AUTO: 214 10*3/MM3 (ref 140–450)
PMV BLD AUTO: 10.5 FL (ref 6–12)
POTASSIUM SERPL-SCNC: 3.5 MMOL/L (ref 3.5–5.2)
RBC # BLD AUTO: 4.39 10*6/MM3 (ref 3.77–5.28)
SODIUM SERPL-SCNC: 139 MMOL/L (ref 136–145)
WBC NRBC COR # BLD AUTO: 9.36 10*3/MM3 (ref 3.4–10.8)

## 2023-11-30 PROCEDURE — 85027 COMPLETE CBC AUTOMATED: CPT

## 2023-11-30 PROCEDURE — 80048 BASIC METABOLIC PNL TOTAL CA: CPT

## 2023-11-30 PROCEDURE — 36415 COLL VENOUS BLD VENIPUNCTURE: CPT

## 2023-11-30 NOTE — DISCHARGE INSTRUCTIONS
PAT PASS GIVEN/REVIEWED WITH PT.  VERBALIZED UNDERSTANDING OF THE FOLLOWING:  DO NOT EAT, DRINK, SMOKE, USE SMOKELESS TOBACCO OR CHEW GUM AFTER MIDNIGHT THE NIGHT BEFORE SURGERY.  THIS ALSO INCLUDES HARD CANDIES AND MINTS.    DO NOT SHAVE THE AREA TO BE OPERATED ON AT LEAST 48 HOURS PRIOR TO THE PROCEDURE.  DO NOT WEAR MAKE UP OR NAIL POLISH.  DO NOT LEAVE IN ANY PIERCING OR WEAR JEWELRY THE DAY OF SURGERY.      DO NOT USE ADHESIVES IF YOU WEAR DENTURES.    DO NOT WEAR EYE CONTACTS; BRING IN YOUR GLASSES.    ONLY TAKE MEDICATION THE MORNING OF YOUR PROCEDURE IF INSTRUCTED BY YOUR SURGEON WITH ENOUGH WATER TO SWALLOW THE MEDICATION.  IF YOUR SURGEON DID NOT SPECIFY WHICH MEDICATIONS TO TAKE, YOU WILL NEED TO CALL THEIR OFFICE FOR FURTHER INSTRUCTIONS AND DO AS THEY INSTRUCT.    LEAVE ANYTHING YOU CONSIDER VALUABLE AT HOME.    YOU WILL NEED TO ARRANGE FOR SOMEONE TO DRIVE YOU HOME AFTER SURGERY.  IT IS RECOMMENDED THAT YOU DO NOT DRIVE, WORK, DRINK ALCOHOL OR MAKE MAJOR DECISIONS FOR AT LEAST 24 HOURS AFTER YOUR PROCEDURE IS COMPLETE.      THE DAY OF YOUR PROCEDURE, BRING IN THE FOLLOWING IF APPLICABLE:   PICTURE ID AND INSURANCE/MEDICARE OR MEDICAID CARDS/ANY CO-PAY THAT MAY BE DUE   COPY OF ADVANCED DIRECTIVE/LIVING WILL/POWER OR    CPAP/BIPAP/INHALERS   SKIN PREP SHEET   YOUR PREADMISSION TESTING PASS (IF NOT A PHONE HISTORY)    Medication instructions given to pt by RN per anesthesia protocol.  Pt referred back to surgeon for further instructions if he/she is on any blood thinners.          Chlorhexadine wipes along with instruction/verification sheet given to pt.  Instructed pt to date, time, and initial the verification sheet once skin prep has been  completed, and [facility] of the procedure.  Pt. Verbalizes understanding.     Introduction to anesthesia video viewed by pt in PAT.

## 2024-07-16 ENCOUNTER — HOSPITAL ENCOUNTER (OUTPATIENT)
Facility: HOSPITAL | Age: 59
Discharge: HOME | End: 2024-07-16
Payer: MEDICAID

## 2024-07-16 LAB
25(OH)D3 SERPL-MCNC: 41 NG/ML (ref 32–100)
ALBUMIN SERPL-MCNC: 3.9 G/DL (ref 3.4–4.8)
ALBUMIN/GLOB SERPL: 1.3 {RATIO} (ref 0.8–2)
ALP SERPL-CCNC: 83 U/L (ref 25–100)
ALT SERPL-CCNC: 9 U/L (ref 4–36)
ANION GAP SERPL CALCULATED.3IONS-SCNC: 9 MMOL/L (ref 3–16)
AST SERPL-CCNC: 16 U/L (ref 8–33)
BILIRUB SERPL-MCNC: 0.4 MG/DL (ref 0.3–1.2)
BUN SERPL-MCNC: 18 MG/DL (ref 6–20)
CALCIUM SERPL-MCNC: 9.4 MG/DL (ref 8.5–10.5)
CHLORIDE SERPL-SCNC: 105 MMOL/L (ref 98–107)
CHOLEST SERPL-MCNC: 199 MG/DL (ref 0–200)
CO2 SERPL-SCNC: 27 MMOL/L (ref 20–30)
CREAT SERPL-MCNC: 0.8 MG/DL (ref 0.4–1.2)
ERYTHROCYTE [DISTWIDTH] IN BLOOD BY AUTOMATED COUNT: 13.5 % (ref 11–16)
GFR SERPLBLD CREATININE-BSD FMLA CKD-EPI: 84 ML/MIN/{1.73_M2}
GLOBULIN SER CALC-MCNC: 2.9 G/DL
GLUCOSE SERPL-MCNC: 102 MG/DL (ref 74–106)
HCT VFR BLD AUTO: 40.2 % (ref 37–47)
HDLC SERPL-MCNC: 45 MG/DL (ref 40–60)
HGB BLD-MCNC: 13.8 G/DL (ref 11.5–16.5)
LDLC SERPL CALC-MCNC: 118 MG/DL
MCH RBC QN AUTO: 30.7 PG (ref 27–32)
MCHC RBC AUTO-ENTMCNC: 34.3 G/DL (ref 31–35)
MCV RBC AUTO: 89.5 FL (ref 80–100)
PLATELET # BLD AUTO: 174 K/UL (ref 150–400)
PMV BLD AUTO: 9.8 FL (ref 6–10)
POTASSIUM SERPL-SCNC: 3.3 MMOL/L (ref 3.4–5.1)
PROT SERPL-MCNC: 6.8 G/DL (ref 6.4–8.3)
RBC # BLD AUTO: 4.49 M/UL (ref 3.8–5.8)
SODIUM SERPL-SCNC: 141 MMOL/L (ref 136–145)
TRIGL SERPL-MCNC: 179 MG/DL (ref 0–249)
TSH SERPL DL<=0.005 MIU/L-ACNC: 2.51 UIU/ML (ref 0.27–4.2)
VIT B12 SERPL-MCNC: 1525 PG/ML (ref 211–911)
VLDLC SERPL CALC-MCNC: 36 MG/DL
WBC # BLD AUTO: 8.4 K/UL (ref 4–11)

## 2024-07-16 PROCEDURE — 85027 COMPLETE CBC AUTOMATED: CPT

## 2024-07-16 PROCEDURE — 84443 ASSAY THYROID STIM HORMONE: CPT

## 2024-07-16 PROCEDURE — 80061 LIPID PANEL: CPT

## 2024-07-16 PROCEDURE — 82607 VITAMIN B-12: CPT

## 2024-07-16 PROCEDURE — 36415 COLL VENOUS BLD VENIPUNCTURE: CPT

## 2024-07-16 PROCEDURE — 82306 VITAMIN D 25 HYDROXY: CPT

## 2024-07-16 PROCEDURE — 80053 COMPREHEN METABOLIC PANEL: CPT

## 2024-09-05 ENCOUNTER — HOSPITAL ENCOUNTER (OUTPATIENT)
Dept: MAMMOGRAPHY | Facility: HOSPITAL | Age: 59
Discharge: HOME | End: 2024-09-05
Payer: MEDICAID

## 2024-09-05 VITALS — WEIGHT: 167 LBS | BODY MASS INDEX: 32.61 KG/M2

## 2024-09-05 DIAGNOSIS — Z12.31 SCREENING MAMMOGRAM, ENCOUNTER FOR: ICD-10-CM

## 2024-09-05 PROCEDURE — 77063 BREAST TOMOSYNTHESIS BI: CPT

## (undated) DEVICE — GLV SURG SENSICARE W/ALOE PF LF 8.5 STRL

## (undated) DEVICE — FRCP BIOP RADLJAW4 HOT 2.2X240 BX40

## (undated) DEVICE — LUBE JELLY PK/2.75GM STRL BX/144

## (undated) DEVICE — Device

## (undated) DEVICE — HYBRID TUBING/CAP SET FOR OLYMPUS® SCOPES: Brand: ERBE

## (undated) DEVICE — ENDOSCOPY PORT CONNECTOR FOR OLYMPUS® SCOPES: Brand: ERBE

## (undated) DEVICE — PAD GRND REM POLYHESIVE A/ DISP

## (undated) DEVICE — Device: Brand: DEFENDO AIR/WATER/SUCTION AND BIOPSY VALVE